# Patient Record
Sex: MALE | Race: OTHER | NOT HISPANIC OR LATINO
[De-identification: names, ages, dates, MRNs, and addresses within clinical notes are randomized per-mention and may not be internally consistent; named-entity substitution may affect disease eponyms.]

---

## 2017-10-31 PROBLEM — Z00.00 ENCOUNTER FOR PREVENTIVE HEALTH EXAMINATION: Status: ACTIVE | Noted: 2017-10-31

## 2017-11-07 ENCOUNTER — APPOINTMENT (OUTPATIENT)
Dept: SPINE | Facility: CLINIC | Age: 52
End: 2017-11-07
Payer: COMMERCIAL

## 2017-11-07 VITALS
BODY MASS INDEX: 28.23 KG/M2 | SYSTOLIC BLOOD PRESSURE: 118 MMHG | WEIGHT: 220 LBS | DIASTOLIC BLOOD PRESSURE: 70 MMHG | HEIGHT: 74 IN

## 2017-11-07 DIAGNOSIS — Z78.9 OTHER SPECIFIED HEALTH STATUS: ICD-10-CM

## 2017-11-07 DIAGNOSIS — Z82.49 FAMILY HISTORY OF ISCHEMIC HEART DISEASE AND OTHER DISEASES OF THE CIRCULATORY SYSTEM: ICD-10-CM

## 2017-11-07 DIAGNOSIS — Z86.79 PERSONAL HISTORY OF OTHER DISEASES OF THE CIRCULATORY SYSTEM: ICD-10-CM

## 2017-11-07 DIAGNOSIS — Z82.61 FAMILY HISTORY OF ARTHRITIS: ICD-10-CM

## 2017-11-07 DIAGNOSIS — Z83.3 FAMILY HISTORY OF DIABETES MELLITUS: ICD-10-CM

## 2017-11-07 PROCEDURE — 99204 OFFICE O/P NEW MOD 45 MIN: CPT

## 2017-11-07 RX ORDER — ATORVASTATIN CALCIUM 40 MG/1
40 TABLET, FILM COATED ORAL
Refills: 0 | Status: ACTIVE | COMMUNITY

## 2017-11-07 RX ORDER — LISINOPRIL 20 MG/1
20 TABLET ORAL
Refills: 0 | Status: ACTIVE | COMMUNITY

## 2017-11-07 RX ORDER — BRINZOLAMIDE 10 MG/ML
SUSPENSION/ DROPS OPHTHALMIC
Refills: 0 | Status: ACTIVE | COMMUNITY

## 2017-11-07 RX ORDER — VALACYCLOVIR 1 G/1
1 TABLET, FILM COATED ORAL
Refills: 0 | Status: ACTIVE | COMMUNITY

## 2017-12-19 ENCOUNTER — APPOINTMENT (OUTPATIENT)
Dept: SPINE | Facility: CLINIC | Age: 52
End: 2017-12-19
Payer: COMMERCIAL

## 2017-12-19 VITALS
WEIGHT: 210 LBS | HEIGHT: 74 IN | BODY MASS INDEX: 26.95 KG/M2 | DIASTOLIC BLOOD PRESSURE: 76 MMHG | SYSTOLIC BLOOD PRESSURE: 110 MMHG

## 2017-12-19 PROCEDURE — 99213 OFFICE O/P EST LOW 20 MIN: CPT

## 2018-03-29 ENCOUNTER — APPOINTMENT (OUTPATIENT)
Dept: OTOLARYNGOLOGY | Facility: HOSPITAL | Age: 53
End: 2018-03-29

## 2018-03-29 ENCOUNTER — APPOINTMENT (OUTPATIENT)
Dept: SPINE | Facility: HOSPITAL | Age: 53
End: 2018-03-29

## 2018-04-05 ENCOUNTER — OUTPATIENT (OUTPATIENT)
Dept: OUTPATIENT SERVICES | Facility: HOSPITAL | Age: 53
LOS: 1 days | End: 2018-04-05
Payer: COMMERCIAL

## 2018-04-05 VITALS
SYSTOLIC BLOOD PRESSURE: 129 MMHG | TEMPERATURE: 97 F | WEIGHT: 210.98 LBS | HEART RATE: 63 BPM | OXYGEN SATURATION: 98 % | HEIGHT: 74 IN | RESPIRATION RATE: 18 BRPM | DIASTOLIC BLOOD PRESSURE: 85 MMHG

## 2018-04-05 DIAGNOSIS — Z01.818 ENCOUNTER FOR OTHER PREPROCEDURAL EXAMINATION: ICD-10-CM

## 2018-04-05 DIAGNOSIS — D35.2 BENIGN NEOPLASM OF PITUITARY GLAND: ICD-10-CM

## 2018-04-05 DIAGNOSIS — D49.7 NEOPLASM OF UNSPECIFIED BEHAVIOR OF ENDOCRINE GLANDS AND OTHER PARTS OF NERVOUS SYSTEM: ICD-10-CM

## 2018-04-05 LAB
ANION GAP SERPL CALC-SCNC: 10 MMOL/L — SIGNIFICANT CHANGE UP (ref 5–17)
BLD GP AB SCN SERPL QL: NEGATIVE — SIGNIFICANT CHANGE UP
BUN SERPL-MCNC: 15 MG/DL — SIGNIFICANT CHANGE UP (ref 7–23)
CALCIUM SERPL-MCNC: 9.4 MG/DL — SIGNIFICANT CHANGE UP (ref 8.4–10.5)
CHLORIDE SERPL-SCNC: 103 MMOL/L — SIGNIFICANT CHANGE UP (ref 96–108)
CO2 SERPL-SCNC: 26 MMOL/L — SIGNIFICANT CHANGE UP (ref 22–31)
CREAT SERPL-MCNC: 0.96 MG/DL — SIGNIFICANT CHANGE UP (ref 0.5–1.3)
GLUCOSE SERPL-MCNC: 131 MG/DL — HIGH (ref 70–99)
HCT VFR BLD CALC: 40.6 % — SIGNIFICANT CHANGE UP (ref 39–50)
HGB BLD-MCNC: 13.5 G/DL — SIGNIFICANT CHANGE UP (ref 13–17)
MCHC RBC-ENTMCNC: 29.7 PG — SIGNIFICANT CHANGE UP (ref 27–34)
MCHC RBC-ENTMCNC: 33.3 GM/DL — SIGNIFICANT CHANGE UP (ref 32–36)
MCV RBC AUTO: 89.2 FL — SIGNIFICANT CHANGE UP (ref 80–100)
NRBC # BLD: 0 /100 WBCS — SIGNIFICANT CHANGE UP (ref 0–0)
PLATELET # BLD AUTO: 216 K/UL — SIGNIFICANT CHANGE UP (ref 150–400)
POTASSIUM SERPL-MCNC: 3.9 MMOL/L — SIGNIFICANT CHANGE UP (ref 3.5–5.3)
POTASSIUM SERPL-SCNC: 3.9 MMOL/L — SIGNIFICANT CHANGE UP (ref 3.5–5.3)
RBC # BLD: 4.55 M/UL — SIGNIFICANT CHANGE UP (ref 4.2–5.8)
RBC # FLD: 14.4 % — SIGNIFICANT CHANGE UP (ref 10.3–14.5)
RH IG SCN BLD-IMP: POSITIVE — SIGNIFICANT CHANGE UP
SODIUM SERPL-SCNC: 139 MMOL/L — SIGNIFICANT CHANGE UP (ref 135–145)
WBC # BLD: 5.46 K/UL — SIGNIFICANT CHANGE UP (ref 3.8–10.5)
WBC # FLD AUTO: 5.46 K/UL — SIGNIFICANT CHANGE UP (ref 3.8–10.5)

## 2018-04-05 PROCEDURE — 85027 COMPLETE CBC AUTOMATED: CPT

## 2018-04-05 PROCEDURE — 86901 BLOOD TYPING SEROLOGIC RH(D): CPT

## 2018-04-05 PROCEDURE — 86850 RBC ANTIBODY SCREEN: CPT

## 2018-04-05 PROCEDURE — 80048 BASIC METABOLIC PNL TOTAL CA: CPT

## 2018-04-05 PROCEDURE — G0463: CPT

## 2018-04-05 PROCEDURE — 86900 BLOOD TYPING SEROLOGIC ABO: CPT

## 2018-04-05 RX ORDER — SODIUM CHLORIDE 9 MG/ML
3 INJECTION INTRAMUSCULAR; INTRAVENOUS; SUBCUTANEOUS EVERY 8 HOURS
Qty: 0 | Refills: 0 | Status: DISCONTINUED | OUTPATIENT
Start: 2018-04-12 | End: 2018-04-12

## 2018-04-05 RX ORDER — CEFAZOLIN SODIUM 1 G
2000 VIAL (EA) INJECTION ONCE
Qty: 0 | Refills: 0 | Status: COMPLETED | OUTPATIENT
Start: 2018-04-12 | End: 2018-04-12

## 2018-04-05 NOTE — H&P PST ADULT - HISTORY OF PRESENT ILLNESS
53 y/o M PMH vision loss left eye last year, was referred for MRI of the head by opthamologist and found to 53 y/o M PMH vision loss left eye last year, was referred for MRI of the head by opthamologist and found to have a pituitary tumor.  Presents today for endoscopic removal of pituitary tumor sella meningioma.

## 2018-04-05 NOTE — H&P PST ADULT - NSANTHOSAYNRD_GEN_A_CORE
No. YESENIA screening performed.  STOP BANG Legend: 0-2 = LOW Risk; 3-4 = INTERMEDIATE Risk; 5-8 = HIGH Risk

## 2018-04-11 ENCOUNTER — TRANSCRIPTION ENCOUNTER (OUTPATIENT)
Age: 53
End: 2018-04-11

## 2018-04-11 ENCOUNTER — APPOINTMENT (OUTPATIENT)
Dept: CT IMAGING | Facility: HOSPITAL | Age: 53
End: 2018-04-11

## 2018-04-11 ENCOUNTER — APPOINTMENT (OUTPATIENT)
Dept: MRI IMAGING | Facility: HOSPITAL | Age: 53
End: 2018-04-11

## 2018-04-11 ENCOUNTER — OUTPATIENT (OUTPATIENT)
Dept: OUTPATIENT SERVICES | Facility: HOSPITAL | Age: 53
LOS: 1 days | End: 2018-04-11
Payer: COMMERCIAL

## 2018-04-11 DIAGNOSIS — D35.2 BENIGN NEOPLASM OF PITUITARY GLAND: ICD-10-CM

## 2018-04-11 DIAGNOSIS — G93.9 DISORDER OF BRAIN, UNSPECIFIED: ICD-10-CM

## 2018-04-11 PROCEDURE — 70450 CT HEAD/BRAIN W/O DYE: CPT

## 2018-04-11 PROCEDURE — 70450 CT HEAD/BRAIN W/O DYE: CPT | Mod: 26

## 2018-04-11 PROCEDURE — 70553 MRI BRAIN STEM W/O & W/DYE: CPT

## 2018-04-11 PROCEDURE — 70553 MRI BRAIN STEM W/O & W/DYE: CPT | Mod: 26

## 2018-04-11 PROCEDURE — A9585: CPT

## 2018-04-12 ENCOUNTER — APPOINTMENT (OUTPATIENT)
Dept: SPINE | Facility: HOSPITAL | Age: 53
End: 2018-04-12

## 2018-04-12 ENCOUNTER — INPATIENT (INPATIENT)
Facility: HOSPITAL | Age: 53
LOS: 7 days | Discharge: HOME | DRG: 26 | End: 2018-04-20
Attending: NEUROLOGICAL SURGERY | Admitting: NEUROLOGICAL SURGERY
Payer: COMMERCIAL

## 2018-04-12 ENCOUNTER — RESULT REVIEW (OUTPATIENT)
Age: 53
End: 2018-04-12

## 2018-04-12 ENCOUNTER — APPOINTMENT (OUTPATIENT)
Dept: OTOLARYNGOLOGY | Facility: HOSPITAL | Age: 53
End: 2018-04-12

## 2018-04-12 VITALS
RESPIRATION RATE: 16 BRPM | HEIGHT: 74 IN | WEIGHT: 212.08 LBS | HEART RATE: 65 BPM | TEMPERATURE: 98 F | DIASTOLIC BLOOD PRESSURE: 82 MMHG | SYSTOLIC BLOOD PRESSURE: 127 MMHG | OXYGEN SATURATION: 99 %

## 2018-04-12 DIAGNOSIS — D35.2 BENIGN NEOPLASM OF PITUITARY GLAND: ICD-10-CM

## 2018-04-12 DIAGNOSIS — E23.7 DISORDER OF PITUITARY GLAND, UNSPECIFIED: ICD-10-CM

## 2018-04-12 LAB
ANION GAP SERPL CALC-SCNC: 13 MMOL/L — SIGNIFICANT CHANGE UP (ref 5–17)
BASOPHILS # BLD AUTO: 0 K/UL — SIGNIFICANT CHANGE UP (ref 0–0.2)
BASOPHILS NFR BLD AUTO: 0.1 % — SIGNIFICANT CHANGE UP (ref 0–2)
BUN SERPL-MCNC: 9 MG/DL — SIGNIFICANT CHANGE UP (ref 7–23)
CALCIUM SERPL-MCNC: 8.3 MG/DL — LOW (ref 8.4–10.5)
CHLORIDE SERPL-SCNC: 103 MMOL/L — SIGNIFICANT CHANGE UP (ref 96–108)
CO2 SERPL-SCNC: 22 MMOL/L — SIGNIFICANT CHANGE UP (ref 22–31)
CREAT SERPL-MCNC: 0.86 MG/DL — SIGNIFICANT CHANGE UP (ref 0.5–1.3)
EOSINOPHIL # BLD AUTO: 0 K/UL — SIGNIFICANT CHANGE UP (ref 0–0.5)
EOSINOPHIL NFR BLD AUTO: 0.1 % — SIGNIFICANT CHANGE UP (ref 0–6)
GLUCOSE BLDC GLUCOMTR-MCNC: 144 MG/DL — HIGH (ref 70–99)
GLUCOSE SERPL-MCNC: 203 MG/DL — HIGH (ref 70–99)
HCT VFR BLD CALC: 38.6 % — LOW (ref 39–50)
HGB BLD-MCNC: 13.3 G/DL — SIGNIFICANT CHANGE UP (ref 13–17)
LYMPHOCYTES # BLD AUTO: 1 K/UL — SIGNIFICANT CHANGE UP (ref 1–3.3)
LYMPHOCYTES # BLD AUTO: 7.9 % — LOW (ref 13–44)
MAGNESIUM SERPL-MCNC: 1.8 MG/DL — SIGNIFICANT CHANGE UP (ref 1.6–2.6)
MCHC RBC-ENTMCNC: 31.4 PG — SIGNIFICANT CHANGE UP (ref 27–34)
MCHC RBC-ENTMCNC: 34.4 GM/DL — SIGNIFICANT CHANGE UP (ref 32–36)
MCV RBC AUTO: 91.4 FL — SIGNIFICANT CHANGE UP (ref 80–100)
MONOCYTES # BLD AUTO: 0.3 K/UL — SIGNIFICANT CHANGE UP (ref 0–0.9)
MONOCYTES NFR BLD AUTO: 2.5 % — SIGNIFICANT CHANGE UP (ref 2–14)
NEUTROPHILS # BLD AUTO: 11.5 K/UL — HIGH (ref 1.8–7.4)
NEUTROPHILS NFR BLD AUTO: 89.5 % — HIGH (ref 43–77)
PHOSPHATE SERPL-MCNC: 2.3 MG/DL — LOW (ref 2.5–4.5)
PLATELET # BLD AUTO: 211 K/UL — SIGNIFICANT CHANGE UP (ref 150–400)
POTASSIUM SERPL-MCNC: 4 MMOL/L — SIGNIFICANT CHANGE UP (ref 3.5–5.3)
POTASSIUM SERPL-SCNC: 4 MMOL/L — SIGNIFICANT CHANGE UP (ref 3.5–5.3)
RBC # BLD: 4.22 M/UL — SIGNIFICANT CHANGE UP (ref 4.2–5.8)
RBC # FLD: 12.1 % — SIGNIFICANT CHANGE UP (ref 10.3–14.5)
RH IG SCN BLD-IMP: POSITIVE — SIGNIFICANT CHANGE UP
SODIUM SERPL-SCNC: 138 MMOL/L — SIGNIFICANT CHANGE UP (ref 135–145)
WBC # BLD: 12.8 K/UL — HIGH (ref 3.8–10.5)
WBC # FLD AUTO: 12.8 K/UL — HIGH (ref 3.8–10.5)

## 2018-04-12 PROCEDURE — 99291 CRITICAL CARE FIRST HOUR: CPT

## 2018-04-12 PROCEDURE — 61782 SCAN PROC CRANIAL EXTRA: CPT

## 2018-04-12 PROCEDURE — 61580 CRANIOFACIAL APPROACH SKULL: CPT | Mod: 80

## 2018-04-12 PROCEDURE — 88307 TISSUE EXAM BY PATHOLOGIST: CPT | Mod: 26

## 2018-04-12 PROCEDURE — 88334 PATH CONSLTJ SURG CYTO XM EA: CPT | Mod: 26,59

## 2018-04-12 PROCEDURE — 88341 IMHCHEM/IMCYTCHM EA ADD ANTB: CPT | Mod: 26,59

## 2018-04-12 PROCEDURE — 88342 IMHCHEM/IMCYTCHM 1ST ANTB: CPT | Mod: 26,59

## 2018-04-12 PROCEDURE — 88360 TUMOR IMMUNOHISTOCHEM/MANUAL: CPT | Mod: 26

## 2018-04-12 PROCEDURE — 88305 TISSUE EXAM BY PATHOLOGIST: CPT | Mod: 26

## 2018-04-12 PROCEDURE — 61580 CRANIOFACIAL APPROACH SKULL: CPT

## 2018-04-12 PROCEDURE — 20926: CPT

## 2018-04-12 PROCEDURE — 62272 THER SPI PNXR DRG CSF: CPT

## 2018-04-12 PROCEDURE — 61608 RESECT/EXCISE CRANIAL LESION: CPT

## 2018-04-12 PROCEDURE — 15576 PEDICLE E/N/E/L/NTRORAL: CPT

## 2018-04-12 PROCEDURE — 88331 PATH CONSLTJ SURG 1 BLK 1SPC: CPT | Mod: 26

## 2018-04-12 PROCEDURE — 61608 RESECT/EXCISE CRANIAL LESION: CPT | Mod: 80

## 2018-04-12 PROCEDURE — 61781 SCAN PROC CRANIAL INTRA: CPT

## 2018-04-12 RX ORDER — INSULIN LISPRO 100/ML
VIAL (ML) SUBCUTANEOUS
Qty: 0 | Refills: 0 | Status: DISCONTINUED | OUTPATIENT
Start: 2018-04-12 | End: 2018-04-13

## 2018-04-12 RX ORDER — ONDANSETRON 8 MG/1
4 TABLET, FILM COATED ORAL EVERY 6 HOURS
Qty: 0 | Refills: 0 | Status: DISCONTINUED | OUTPATIENT
Start: 2018-04-12 | End: 2018-04-20

## 2018-04-12 RX ORDER — ATORVASTATIN CALCIUM 80 MG/1
1 TABLET, FILM COATED ORAL
Qty: 0 | Refills: 0 | COMMUNITY

## 2018-04-12 RX ORDER — OXYCODONE HYDROCHLORIDE 5 MG/1
10 TABLET ORAL EVERY 4 HOURS
Qty: 0 | Refills: 0 | Status: DISCONTINUED | OUTPATIENT
Start: 2018-04-12 | End: 2018-04-19

## 2018-04-12 RX ORDER — ATORVASTATIN CALCIUM 80 MG/1
40 TABLET, FILM COATED ORAL AT BEDTIME
Qty: 0 | Refills: 0 | Status: DISCONTINUED | OUTPATIENT
Start: 2018-04-12 | End: 2018-04-20

## 2018-04-12 RX ORDER — ACETAMINOPHEN 500 MG
650 TABLET ORAL EVERY 6 HOURS
Qty: 0 | Refills: 0 | Status: DISCONTINUED | OUTPATIENT
Start: 2018-04-12 | End: 2018-04-20

## 2018-04-12 RX ORDER — MAGNESIUM SULFATE 500 MG/ML
1 VIAL (ML) INJECTION ONCE
Qty: 0 | Refills: 0 | Status: COMPLETED | OUTPATIENT
Start: 2018-04-12 | End: 2018-04-12

## 2018-04-12 RX ORDER — LIDOCAINE HCL 20 MG/ML
0.2 VIAL (ML) INJECTION ONCE
Qty: 0 | Refills: 0 | Status: COMPLETED | OUTPATIENT
Start: 2018-04-12 | End: 2018-04-12

## 2018-04-12 RX ORDER — ACETAMINOPHEN 500 MG
1000 TABLET ORAL ONCE
Qty: 0 | Refills: 0 | Status: COMPLETED | OUTPATIENT
Start: 2018-04-12 | End: 2018-04-12

## 2018-04-12 RX ORDER — LEVOTHYROXINE SODIUM 125 MCG
88 TABLET ORAL DAILY
Qty: 0 | Refills: 0 | Status: DISCONTINUED | OUTPATIENT
Start: 2018-04-12 | End: 2018-04-20

## 2018-04-12 RX ORDER — NICARDIPINE HYDROCHLORIDE 30 MG/1
10 CAPSULE, EXTENDED RELEASE ORAL
Qty: 40 | Refills: 0 | Status: DISCONTINUED | OUTPATIENT
Start: 2018-04-12 | End: 2018-04-12

## 2018-04-12 RX ORDER — CHOLECALCIFEROL (VITAMIN D3) 125 MCG
1 CAPSULE ORAL
Qty: 0 | Refills: 0 | COMMUNITY

## 2018-04-12 RX ORDER — CALCIUM GLUCONATE 100 MG/ML
1 VIAL (ML) INTRAVENOUS ONCE
Qty: 0 | Refills: 0 | Status: COMPLETED | OUTPATIENT
Start: 2018-04-12 | End: 2018-04-13

## 2018-04-12 RX ORDER — BRINZOLAMIDE 10 MG/ML
1 SUSPENSION/ DROPS OPHTHALMIC
Qty: 0 | Refills: 0 | COMMUNITY

## 2018-04-12 RX ORDER — DOCUSATE SODIUM 100 MG
100 CAPSULE ORAL THREE TIMES A DAY
Qty: 0 | Refills: 0 | Status: DISCONTINUED | OUTPATIENT
Start: 2018-04-12 | End: 2018-04-20

## 2018-04-12 RX ORDER — PANTOPRAZOLE SODIUM 20 MG/1
40 TABLET, DELAYED RELEASE ORAL DAILY
Qty: 0 | Refills: 0 | Status: DISCONTINUED | OUTPATIENT
Start: 2018-04-12 | End: 2018-04-15

## 2018-04-12 RX ORDER — DORZOLAMIDE HYDROCHLORIDE 20 MG/ML
1 SOLUTION/ DROPS OPHTHALMIC ONCE
Qty: 0 | Refills: 0 | Status: COMPLETED | OUTPATIENT
Start: 2018-04-12 | End: 2018-04-12

## 2018-04-12 RX ORDER — SODIUM CHLORIDE 0.65 %
1 AEROSOL, SPRAY (ML) NASAL DAILY
Qty: 0 | Refills: 0 | Status: DISCONTINUED | OUTPATIENT
Start: 2018-04-12 | End: 2018-04-20

## 2018-04-12 RX ORDER — DEXTROSE MONOHYDRATE, SODIUM CHLORIDE, AND POTASSIUM CHLORIDE 50; .745; 4.5 G/1000ML; G/1000ML; G/1000ML
1000 INJECTION, SOLUTION INTRAVENOUS
Qty: 0 | Refills: 0 | Status: DISCONTINUED | OUTPATIENT
Start: 2018-04-12 | End: 2018-04-14

## 2018-04-12 RX ORDER — POTASSIUM PHOSPHATE, MONOBASIC POTASSIUM PHOSPHATE, DIBASIC 236; 224 MG/ML; MG/ML
15 INJECTION, SOLUTION INTRAVENOUS ONCE
Qty: 0 | Refills: 0 | Status: COMPLETED | OUTPATIENT
Start: 2018-04-12 | End: 2018-04-12

## 2018-04-12 RX ORDER — OXYCODONE HYDROCHLORIDE 5 MG/1
5 TABLET ORAL EVERY 4 HOURS
Qty: 0 | Refills: 0 | Status: DISCONTINUED | OUTPATIENT
Start: 2018-04-12 | End: 2018-04-12

## 2018-04-12 RX ADMIN — Medication 100 GRAM(S): at 23:44

## 2018-04-12 RX ADMIN — OXYCODONE HYDROCHLORIDE 10 MILLIGRAM(S): 5 TABLET ORAL at 18:09

## 2018-04-12 RX ADMIN — OXYCODONE HYDROCHLORIDE 5 MILLIGRAM(S): 5 TABLET ORAL at 16:17

## 2018-04-12 RX ADMIN — DORZOLAMIDE HYDROCHLORIDE 1 DROP(S): 20 SOLUTION/ DROPS OPHTHALMIC at 17:05

## 2018-04-12 RX ADMIN — POTASSIUM PHOSPHATE, MONOBASIC POTASSIUM PHOSPHATE, DIBASIC 62.5 MILLIMOLE(S): 236; 224 INJECTION, SOLUTION INTRAVENOUS at 23:44

## 2018-04-12 RX ADMIN — PANTOPRAZOLE SODIUM 40 MILLIGRAM(S): 20 TABLET, DELAYED RELEASE ORAL at 16:17

## 2018-04-12 RX ADMIN — ATORVASTATIN CALCIUM 40 MILLIGRAM(S): 80 TABLET, FILM COATED ORAL at 21:07

## 2018-04-12 RX ADMIN — Medication 1 SPRAY(S): at 17:08

## 2018-04-12 RX ADMIN — Medication 0.2 MILLILITER(S): at 05:58

## 2018-04-12 RX ADMIN — DEXTROSE MONOHYDRATE, SODIUM CHLORIDE, AND POTASSIUM CHLORIDE 75 MILLILITER(S): 50; .745; 4.5 INJECTION, SOLUTION INTRAVENOUS at 21:07

## 2018-04-12 RX ADMIN — Medication 88 MICROGRAM(S): at 17:05

## 2018-04-12 RX ADMIN — Medication 1000 MILLIGRAM(S): at 15:20

## 2018-04-12 RX ADMIN — OXYCODONE HYDROCHLORIDE 10 MILLIGRAM(S): 5 TABLET ORAL at 17:39

## 2018-04-12 RX ADMIN — Medication 100 MILLIGRAM(S): at 21:07

## 2018-04-12 RX ADMIN — Medication 400 MILLIGRAM(S): at 14:50

## 2018-04-12 RX ADMIN — OXYCODONE HYDROCHLORIDE 5 MILLIGRAM(S): 5 TABLET ORAL at 16:47

## 2018-04-12 RX ADMIN — SODIUM CHLORIDE 3 MILLILITER(S): 9 INJECTION INTRAMUSCULAR; INTRAVENOUS; SUBCUTANEOUS at 05:58

## 2018-04-12 NOTE — PROGRESS NOTE ADULT - SUBJECTIVE AND OBJECTIVE BOX
Patient seen and examined    T(C): 36.6 (04-12-18 @ 15:00), Max: 37.2 (04-12-18 @ 13:50)  HR: 85 (04-12-18 @ 18:45) (65 - 85)  BP: 131/73 (04-12-18 @ 18:45) (127/82 - 131/73)  RR: 18 (04-12-18 @ 18:45) (4 - 21)  SpO2: 100% (04-12-18 @ 18:45) (99% - 100%)  04-12-18 @ 07:01  -  04-12-18 @ 20:03  --------------------------------------------------------  IN: 826 mL / OUT: 1105 mL / NET: -279 mL    acetaminophen   Tablet 650 milliGRAM(s) Oral every 6 hours PRN  acetaminophen   Tablet. 650 milliGRAM(s) Oral every 6 hours PRN  atorvastatin 40 milliGRAM(s) Oral at bedtime  ceFAZolin   IVPB 2000 milliGRAM(s) IV Intermittent once  docusate sodium 100 milliGRAM(s) Oral three times a day  levothyroxine 88 MICROGram(s) Oral daily  niCARdipine Infusion 10 mG/Hr IV Continuous <Continuous>  ondansetron Injectable 4 milliGRAM(s) IV Push every 6 hours PRN  oxyCODONE    IR 5 milliGRAM(s) Oral every 4 hours PRN  oxyCODONE    IR 10 milliGRAM(s) Oral every 4 hours PRN  pantoprazole    Tablet 40 milliGRAM(s) Oral daily  sodium chloride 0.65% Nasal 1 Spray(s) Both Nostrils daily  sodium chloride 0.9% with potassium chloride 20 mEq/L 1000 milliLiter(s) IV Continuous <Continuous>        Exam stable    Continue same management     [facility]

## 2018-04-12 NOTE — PROGRESS NOTE ADULT - SUBJECTIVE AND OBJECTIVE BOX
HPI:  51 y/o M with PMH of hypothyroidism, presented with vision loss in left eye last year, was referred for MRI of the head by ophthalmologist and found to have a pituitary tumor.  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    VITALS:  T(C): , Max: 37.2 (04-12-18 @ 13:50)  HR:  (65 - 81)  BP:  (127/82 - 127/82)  ABP:  (144/66 - 163/75)  RR:  (15 - 17)  SpO2:  (99% - 100%)    LABS: reviewed.    IMAGING:   Recent imaging studies were reviewed.     MEDICATIONS:  acetaminophen   Tablet. 650 milliGRAM(s) Oral every 6 hours PRN  atorvastatin 40 milliGRAM(s) Oral at bedtime  docusate sodium 100 milliGRAM(s) Oral three times a day  dorzolamide 2% Ophthalmic Solution 1 Drop(s) Both EYES once  levothyroxine 88 MICROGram(s) Oral daily  ondansetron Injectable 4 milliGRAM(s) IV Push every 6 hours PRN  pantoprazole    Tablet 40 milliGRAM(s) Oral daily  sodium chloride 0.65% Nasal 1 Spray(s) Both Nostrils daily  sodium chloride 0.9% with potassium chloride 20 mEq/L 1000 milliLiter(s) IV Continuous <Continuous>    EXAMINATION:  General:  calm, cooperative.  HEENT:  EOMI, PERRLA, L temporal hemianopsia. Face symmetric.  Neuro:  awake, alert, oriented x 3, follows commands, moves all extremities, 5/5 throughout. Sensation intact including facial.   Cards:  RRR  Respiratory:  no respiratory distress  Abdomen:  soft  Extremities:  no edema  Skin:  warm/dry

## 2018-04-12 NOTE — PROGRESS NOTE ADULT - ASSESSMENT
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    NEURO:  neurochecks q1hr  CT Head in AM, MRI post-op, Surgical drains per NSGY,   Pain control  Activity: [x] OOB as tolerated [] Bedrest [] PT [] OT [] PMNR    PULM:  Pituitary precautions (no incentive spirometry, no straws, no BIPAP)  Tolerates RA.    CV:  -150mmHg, d/c a-line in AM if hemodynamically stable    RENAL:  Strict I+Os  IVF until good PO intake  Drink to thirst    GI:  Diet: Dysphagia screen and then advance diet as tolerated  GI prophylaxis [x] not indicated [] PPI [] other: Pantoprazole home med  Bowel regimen [x] colace [x] senna [] other:    ENDO:   Goal euglycemia (-180)  Pituitary labs  Monitor for DI  Continue Levothyroxine.    HEME/ONC:  VTE prophylaxis: [x] SCDs [] chemoprophylaxis [x] hold chemoprophylaxis due to: fresh postop [] high risk of DVT/PE on admission due to:    ID:  Flor-op antibiotics    SOCIAL/FAMILY:  [x] awaiting [] updated at bedside [] family meeting    CODE STATUS:  [x] Full Code [] DNR [] DNI [] Palliative/Comfort Care    DISPOSITION:  [x] ICU [] Stroke Unit [] Floor [] EMU [] RCU [] PCU    [x] Patient is at high risk of neurologic deterioration/death due to: postop bleeding, cerebral swelling.    Time seen: 1430.  Time spent: 45 critical care minutes

## 2018-04-12 NOTE — PROGRESS NOTE ADULT - SUBJECTIVE AND OBJECTIVE BOX
POD/STATUS POST/ENT ISSUE: POD #0 for TSRP and resection of meningioma     INTERVAL HPI: 52y POD #0 TSRP and resection of meningioma c/b CSF leak, repaired with fat and nasoseptal flap. Pt denies any clear drainage or bleeding from nares, no salty taste, no PND. Pt with headache controlled with meds     Vital Signs Last 24 Hrs  T(C): 36.6 (12 Apr 2018 15:00), Max: 37.2 (12 Apr 2018 13:50)  T(F): 97.8 (12 Apr 2018 15:00), Max: 99 (12 Apr 2018 13:50)  HR: 85 (12 Apr 2018 18:45) (65 - 85)  BP: 131/73 (12 Apr 2018 18:45) (127/82 - 131/73)  BP(mean): 89 (12 Apr 2018 18:45) (89 - 89)  RR: 18 (12 Apr 2018 18:45) (4 - 21)  SpO2: 100% (12 Apr 2018 18:45) (99% - 100%)    LABS:      PHYSICAL EXAM:  Gen: NAD, well-developed  Head: Normocephalic, Atraumatic  Face: no edema/erythema/fluctuance  Eyes:  no scleral injection  Nose: no discharge  Mouth: No Stridor / Drooling / Trismus.  Mucosa moist, tongue/uvula midline, oropharynx clear  Neck: Flat, supple, no lymphadenopathy, trachea midline, no masses  Resp: breathing easily, no stridor  CV: no peripheral edema/cyanosis

## 2018-04-12 NOTE — PATIENT PROFILE ADULT. - VISION (WITH CORRECTIVE LENSES IF THE PATIENT USUALLY WEARS THEM):
Partially impaired: cannot see medication labels or newsprint, but can see obstacles in path, and the surrounding layout; can count fingers at arm's length/loss of vision left eye

## 2018-04-12 NOTE — BRIEF OPERATIVE NOTE - PROCEDURE
<<-----Click on this checkbox to enter Procedure Transphenoidal or transnasal resection of pituitary tumor with magnetic resonance imaging guidance  04/12/2018    Active  [id number]

## 2018-04-12 NOTE — PROGRESS NOTE ADULT - ASSESSMENT
52y POD #0 TSRP and resection of meningioma. No complaints of nasal drainage or epistaxis. Pt does have headache controlled with pain meds

## 2018-04-12 NOTE — BRIEF OPERATIVE NOTE - PRE-OP DX
Meningioma  04/12/2018    Active  Jarrod Zapata  Pituitary tumor  04/12/2018    Active  Jarrod Zapata

## 2018-04-12 NOTE — PROGRESS NOTE ADULT - PROBLEM SELECTOR PLAN 1
- continue to monitor for CSF leak   - pain control   - cnt care per NSCU  - NS qid, leave ocean spray at bedside.   - Call ENT prn

## 2018-04-13 LAB
ALBUMIN SERPL ELPH-MCNC: 3.9 G/DL — SIGNIFICANT CHANGE UP (ref 3.3–5)
ALP SERPL-CCNC: 53 U/L — SIGNIFICANT CHANGE UP (ref 40–120)
ALT FLD-CCNC: 15 U/L RC — SIGNIFICANT CHANGE UP (ref 10–45)
ANION GAP SERPL CALC-SCNC: 11 MMOL/L — SIGNIFICANT CHANGE UP (ref 5–17)
ANION GAP SERPL CALC-SCNC: 11 MMOL/L — SIGNIFICANT CHANGE UP (ref 5–17)
ANION GAP SERPL CALC-SCNC: 12 MMOL/L — SIGNIFICANT CHANGE UP (ref 5–17)
ANION GAP SERPL CALC-SCNC: 12 MMOL/L — SIGNIFICANT CHANGE UP (ref 5–17)
AST SERPL-CCNC: 15 U/L — SIGNIFICANT CHANGE UP (ref 10–40)
BASOPHILS # BLD AUTO: 0 K/UL — SIGNIFICANT CHANGE UP (ref 0–0.2)
BASOPHILS NFR BLD AUTO: 0.1 % — SIGNIFICANT CHANGE UP (ref 0–2)
BILIRUB SERPL-MCNC: 0.7 MG/DL — SIGNIFICANT CHANGE UP (ref 0.2–1.2)
BUN SERPL-MCNC: 10 MG/DL — SIGNIFICANT CHANGE UP (ref 7–23)
BUN SERPL-MCNC: 10 MG/DL — SIGNIFICANT CHANGE UP (ref 7–23)
BUN SERPL-MCNC: 11 MG/DL — SIGNIFICANT CHANGE UP (ref 7–23)
BUN SERPL-MCNC: 11 MG/DL — SIGNIFICANT CHANGE UP (ref 7–23)
CALCIUM SERPL-MCNC: 8.6 MG/DL — SIGNIFICANT CHANGE UP (ref 8.4–10.5)
CALCIUM SERPL-MCNC: 8.6 MG/DL — SIGNIFICANT CHANGE UP (ref 8.4–10.5)
CALCIUM SERPL-MCNC: 8.7 MG/DL — SIGNIFICANT CHANGE UP (ref 8.4–10.5)
CALCIUM SERPL-MCNC: 8.9 MG/DL — SIGNIFICANT CHANGE UP (ref 8.4–10.5)
CHLORIDE SERPL-SCNC: 102 MMOL/L — SIGNIFICANT CHANGE UP (ref 96–108)
CHLORIDE SERPL-SCNC: 104 MMOL/L — SIGNIFICANT CHANGE UP (ref 96–108)
CHLORIDE SERPL-SCNC: 105 MMOL/L — SIGNIFICANT CHANGE UP (ref 96–108)
CHLORIDE SERPL-SCNC: 107 MMOL/L — SIGNIFICANT CHANGE UP (ref 96–108)
CO2 SERPL-SCNC: 20 MMOL/L — LOW (ref 22–31)
CO2 SERPL-SCNC: 21 MMOL/L — LOW (ref 22–31)
CO2 SERPL-SCNC: 22 MMOL/L — SIGNIFICANT CHANGE UP (ref 22–31)
CO2 SERPL-SCNC: 22 MMOL/L — SIGNIFICANT CHANGE UP (ref 22–31)
CORTIS AM PEAK SERPL-MCNC: 20 UG/DL — HIGH (ref 6–18.4)
CREAT SERPL-MCNC: 0.79 MG/DL — SIGNIFICANT CHANGE UP (ref 0.5–1.3)
CREAT SERPL-MCNC: 0.79 MG/DL — SIGNIFICANT CHANGE UP (ref 0.5–1.3)
CREAT SERPL-MCNC: 0.83 MG/DL — SIGNIFICANT CHANGE UP (ref 0.5–1.3)
CREAT SERPL-MCNC: 0.84 MG/DL — SIGNIFICANT CHANGE UP (ref 0.5–1.3)
EOSINOPHIL # BLD AUTO: 0 K/UL — SIGNIFICANT CHANGE UP (ref 0–0.5)
EOSINOPHIL NFR BLD AUTO: 0.2 % — SIGNIFICANT CHANGE UP (ref 0–6)
GLUCOSE BLDC GLUCOMTR-MCNC: 108 MG/DL — HIGH (ref 70–99)
GLUCOSE BLDC GLUCOMTR-MCNC: 119 MG/DL — HIGH (ref 70–99)
GLUCOSE BLDC GLUCOMTR-MCNC: 157 MG/DL — HIGH (ref 70–99)
GLUCOSE SERPL-MCNC: 153 MG/DL — HIGH (ref 70–99)
GLUCOSE SERPL-MCNC: 155 MG/DL — HIGH (ref 70–99)
GLUCOSE SERPL-MCNC: 167 MG/DL — HIGH (ref 70–99)
GLUCOSE SERPL-MCNC: 183 MG/DL — HIGH (ref 70–99)
HBA1C BLD-MCNC: 6.4 % — HIGH (ref 4–5.6)
HCT VFR BLD CALC: 40.3 % — SIGNIFICANT CHANGE UP (ref 39–50)
HGB BLD-MCNC: 13.1 G/DL — SIGNIFICANT CHANGE UP (ref 13–17)
LYMPHOCYTES # BLD AUTO: 13.5 % — SIGNIFICANT CHANGE UP (ref 13–44)
LYMPHOCYTES # BLD AUTO: 2.6 K/UL — SIGNIFICANT CHANGE UP (ref 1–3.3)
MAGNESIUM SERPL-MCNC: 2.2 MG/DL — SIGNIFICANT CHANGE UP (ref 1.6–2.6)
MCHC RBC-ENTMCNC: 29.9 PG — SIGNIFICANT CHANGE UP (ref 27–34)
MCHC RBC-ENTMCNC: 32.4 GM/DL — SIGNIFICANT CHANGE UP (ref 32–36)
MCV RBC AUTO: 92.1 FL — SIGNIFICANT CHANGE UP (ref 80–100)
MONOCYTES # BLD AUTO: 1.3 K/UL — HIGH (ref 0–0.9)
MONOCYTES NFR BLD AUTO: 6.7 % — SIGNIFICANT CHANGE UP (ref 2–14)
NEUTROPHILS # BLD AUTO: 15.4 K/UL — HIGH (ref 1.8–7.4)
NEUTROPHILS NFR BLD AUTO: 79.5 % — HIGH (ref 43–77)
OSMOLALITY SERPL: 291 MOS/KG — SIGNIFICANT CHANGE UP (ref 275–300)
PHOSPHATE SERPL-MCNC: 1.6 MG/DL — LOW (ref 2.5–4.5)
PLATELET # BLD AUTO: 224 K/UL — SIGNIFICANT CHANGE UP (ref 150–400)
POTASSIUM SERPL-MCNC: 4 MMOL/L — SIGNIFICANT CHANGE UP (ref 3.5–5.3)
POTASSIUM SERPL-MCNC: 4.2 MMOL/L — SIGNIFICANT CHANGE UP (ref 3.5–5.3)
POTASSIUM SERPL-MCNC: 4.2 MMOL/L — SIGNIFICANT CHANGE UP (ref 3.5–5.3)
POTASSIUM SERPL-MCNC: 4.3 MMOL/L — SIGNIFICANT CHANGE UP (ref 3.5–5.3)
POTASSIUM SERPL-SCNC: 4 MMOL/L — SIGNIFICANT CHANGE UP (ref 3.5–5.3)
POTASSIUM SERPL-SCNC: 4.2 MMOL/L — SIGNIFICANT CHANGE UP (ref 3.5–5.3)
POTASSIUM SERPL-SCNC: 4.2 MMOL/L — SIGNIFICANT CHANGE UP (ref 3.5–5.3)
POTASSIUM SERPL-SCNC: 4.3 MMOL/L — SIGNIFICANT CHANGE UP (ref 3.5–5.3)
PROT SERPL-MCNC: 6.9 G/DL — SIGNIFICANT CHANGE UP (ref 6–8.3)
RBC # BLD: 4.38 M/UL — SIGNIFICANT CHANGE UP (ref 4.2–5.8)
RBC # FLD: 12.6 % — SIGNIFICANT CHANGE UP (ref 10.3–14.5)
SODIUM SERPL-SCNC: 135 MMOL/L — SIGNIFICANT CHANGE UP (ref 135–145)
SODIUM SERPL-SCNC: 136 MMOL/L — SIGNIFICANT CHANGE UP (ref 135–145)
SODIUM SERPL-SCNC: 138 MMOL/L — SIGNIFICANT CHANGE UP (ref 135–145)
SODIUM SERPL-SCNC: 140 MMOL/L — SIGNIFICANT CHANGE UP (ref 135–145)
SP GR UR STRIP: 1.01 — SIGNIFICANT CHANGE UP (ref 1.01–1.02)
T4 FREE SERPL-MCNC: 0.6 NG/DL — LOW (ref 0.9–1.8)
TSH SERPL-MCNC: 1.21 UIU/ML — SIGNIFICANT CHANGE UP (ref 0.27–4.2)
WBC # BLD: 19.4 K/UL — HIGH (ref 3.8–10.5)
WBC # FLD AUTO: 19.4 K/UL — HIGH (ref 3.8–10.5)

## 2018-04-13 PROCEDURE — 70450 CT HEAD/BRAIN W/O DYE: CPT | Mod: 26

## 2018-04-13 PROCEDURE — 99233 SBSQ HOSP IP/OBS HIGH 50: CPT

## 2018-04-13 PROCEDURE — 99024 POSTOP FOLLOW-UP VISIT: CPT

## 2018-04-13 RX ORDER — ACETAMINOPHEN 500 MG
1000 TABLET ORAL ONCE
Qty: 0 | Refills: 0 | Status: COMPLETED | OUTPATIENT
Start: 2018-04-13 | End: 2018-04-13

## 2018-04-13 RX ORDER — FENTANYL CITRATE 50 UG/ML
25 INJECTION INTRAVENOUS ONCE
Qty: 0 | Refills: 0 | Status: DISCONTINUED | OUTPATIENT
Start: 2018-04-13 | End: 2018-04-13

## 2018-04-13 RX ORDER — HYDRALAZINE HCL 50 MG
5 TABLET ORAL ONCE
Qty: 0 | Refills: 0 | Status: COMPLETED | OUTPATIENT
Start: 2018-04-13 | End: 2018-04-13

## 2018-04-13 RX ORDER — HYDRALAZINE HCL 50 MG
10 TABLET ORAL ONCE
Qty: 0 | Refills: 0 | Status: COMPLETED | OUTPATIENT
Start: 2018-04-13 | End: 2018-04-13

## 2018-04-13 RX ORDER — AMLODIPINE BESYLATE 2.5 MG/1
5 TABLET ORAL DAILY
Qty: 0 | Refills: 0 | Status: DISCONTINUED | OUTPATIENT
Start: 2018-04-13 | End: 2018-04-16

## 2018-04-13 RX ADMIN — FENTANYL CITRATE 25 MICROGRAM(S): 50 INJECTION INTRAVENOUS at 07:45

## 2018-04-13 RX ADMIN — FENTANYL CITRATE 25 MICROGRAM(S): 50 INJECTION INTRAVENOUS at 07:30

## 2018-04-13 RX ADMIN — Medication 400 MILLIGRAM(S): at 23:00

## 2018-04-13 RX ADMIN — Medication 400 MILLIGRAM(S): at 08:25

## 2018-04-13 RX ADMIN — Medication 200 GRAM(S): at 05:13

## 2018-04-13 RX ADMIN — Medication 1000 MILLIGRAM(S): at 23:30

## 2018-04-13 RX ADMIN — PANTOPRAZOLE SODIUM 40 MILLIGRAM(S): 20 TABLET, DELAYED RELEASE ORAL at 13:07

## 2018-04-13 RX ADMIN — Medication 1000 MILLIGRAM(S): at 15:30

## 2018-04-13 RX ADMIN — OXYCODONE HYDROCHLORIDE 10 MILLIGRAM(S): 5 TABLET ORAL at 11:22

## 2018-04-13 RX ADMIN — Medication 88 MICROGRAM(S): at 05:13

## 2018-04-13 RX ADMIN — AMLODIPINE BESYLATE 5 MILLIGRAM(S): 2.5 TABLET ORAL at 08:21

## 2018-04-13 RX ADMIN — OXYCODONE HYDROCHLORIDE 10 MILLIGRAM(S): 5 TABLET ORAL at 05:00

## 2018-04-13 RX ADMIN — OXYCODONE HYDROCHLORIDE 10 MILLIGRAM(S): 5 TABLET ORAL at 05:30

## 2018-04-13 RX ADMIN — Medication 100 MILLIGRAM(S): at 22:18

## 2018-04-13 RX ADMIN — Medication 1000 MILLIGRAM(S): at 08:40

## 2018-04-13 RX ADMIN — OXYCODONE HYDROCHLORIDE 10 MILLIGRAM(S): 5 TABLET ORAL at 19:00

## 2018-04-13 RX ADMIN — Medication 5 MILLIGRAM(S): at 21:00

## 2018-04-13 RX ADMIN — OXYCODONE HYDROCHLORIDE 10 MILLIGRAM(S): 5 TABLET ORAL at 19:30

## 2018-04-13 RX ADMIN — Medication 10 MILLIGRAM(S): at 15:19

## 2018-04-13 RX ADMIN — OXYCODONE HYDROCHLORIDE 10 MILLIGRAM(S): 5 TABLET ORAL at 11:40

## 2018-04-13 RX ADMIN — Medication 1 SPRAY(S): at 13:07

## 2018-04-13 RX ADMIN — ATORVASTATIN CALCIUM 40 MILLIGRAM(S): 80 TABLET, FILM COATED ORAL at 22:18

## 2018-04-13 RX ADMIN — Medication 400 MILLIGRAM(S): at 15:19

## 2018-04-13 RX ADMIN — Medication 100 MILLIGRAM(S): at 05:13

## 2018-04-13 RX ADMIN — Medication 100 MILLIGRAM(S): at 17:18

## 2018-04-13 RX ADMIN — Medication 1: at 17:18

## 2018-04-13 NOTE — PROGRESS NOTE ADULT - SUBJECTIVE AND OBJECTIVE BOX
HPI:  53 y/o M with PMH of hypothyroidism, presented with vision loss in left eye last year, was referred for MRI of the head by ophthalmologist and found to have a pituitary tumor.  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    VITALS: reviewed  LABS: reviewed  IMAGING:   Recent imaging studies were reviewed  MEDICATIONS: reviewed    EXAMINATION:  General:  calm, cooperative.  HEENT:  EOMI, PERRLA, L temporal hemianopsia. Face symmetric.  Neuro:  awake, alert, oriented x 3, follows commands, moves all extremities, 5/5 throughout. Sensation intact including facial.   Cards:  RRR  Respiratory:  no respiratory distress  Abdomen:  soft  Extremities:  no edema  Skin:  warm/dry

## 2018-04-13 NOTE — PROGRESS NOTE ADULT - PROBLEM SELECTOR PLAN 1
continue to monitor for CSF leak   - pain control   - cnt care per NSCU  - NS qid, leave ocean spray at bedside.

## 2018-04-13 NOTE — OCCUPATIONAL THERAPY INITIAL EVALUATION ADULT - ADDITIONAL COMMENTS
CT Head 4/11: Multifocal lesions in the region of the planum sphenoidale and sella turcica involving the sellar and suprasellar regions concerning for pituitary tumor with cavernous sinuses invasion, described above Pt s/p Transphenoidal or transnasal resection of pituitary tumor with magnetic resonance imaging guidance 4/12/18.  CT Head 4/11: Multifocal lesions in the region of the planum sphenoidale and sella turcica involving the sellar and suprasellar regions concerning for pituitary tumor with cavernous sinuses invasion, described above.

## 2018-04-13 NOTE — OCCUPATIONAL THERAPY INITIAL EVALUATION ADULT - DIAGNOSIS, OT EVAL
Pt with impaired strength, balance, vision impacting pt's ability to complete ADLs, IADLs, work, drive.

## 2018-04-13 NOTE — PROGRESS NOTE ADULT - ASSESSMENT
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    NEURO:  neurochecks q1hr  CT Head in AM, MRI post-op  LD at 5cc/hr  Pain control  Activity: [x] OOB as tolerated [] Bedrest [x] PT [x] OT [] PMNR    PULM:  Pituitary precautions (no incentive spirometry, no straws, no BIPAP)  Tolerates RA.    CV:  -150mmHg, d/c a-line in AM if hemodynamically stable    RENAL:  Strict I+Os  IVF until good PO intake  Drink to thirst    GI:  Diet: Dysphagia screen and then advance diet as tolerated  GI prophylaxis [x] not indicated [] PPI [] other: Pantoprazole home med  Bowel regimen [x] colace [x] senna [] other:    ENDO:   Goal euglycemia (-180)  Pituitary labs  Monitor for DI  Continue Levothyroxine.    HEME/ONC:  VTE prophylaxis: [x] SCDs [x] chemoprophylaxis start lovenox pending stable am CTH [] hold chemoprophylaxis due to: fresh postop [] high risk of DVT/PE on admission due to:    ID:  Flor-op antibiotics    SOCIAL/FAMILY:  [x] awaiting [] updated at bedside [] family meeting    CODE STATUS:  [x] Full Code [] DNR [] DNI [] Palliative/Comfort Care    DISPOSITION:  [x] ICU [] Stroke Unit [] Floor [] EMU [] RCU [] PCU    [x] Patient is at high risk of neurologic deterioration/death due to: postop bleeding, cerebral swelling, csf leak, cns infection    Time spent: 45 critical care minutes ASSESSMENT:   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    NEURO:  neurochecks q1hr  CT Head in AM, MRI post-op  LD at 5cc/hr  Pain control  Activity: [x] OOB as tolerated [] Bedrest [x] PT [x] OT [] PMNR    PULM:  Pituitary precautions (no incentive spirometry, no straws, no BIPAP)  Tolerates RA.    CV:  -150mmHg, d/c a-line in AM if hemodynamically stable    RENAL:  Strict I+Os  IVF until good PO intake  Drink to thirst    GI:  Diet: Dysphagia screen and then advance diet as tolerated  GI prophylaxis [x] not indicated [] PPI [] other: Pantoprazole home med  Bowel regimen [x] colace [x] senna [] other:    ENDO:   Goal euglycemia (-180)  Pituitary labs  Monitor for DI  Continue Levothyroxine.    HEME/ONC:  VTE prophylaxis: [x] SCDs [x] chemoprophylaxis start lovenox pending stable am CTH [] hold chemoprophylaxis due to: fresh postop [] high risk of DVT/PE on admission due to:    ID:  Flor-op antibiotics    SOCIAL/FAMILY:  [x] awaiting [] updated at bedside [] family meeting    CODE STATUS:  [x] Full Code [] DNR [] DNI [] Palliative/Comfort Care    DISPOSITION:  [x] ICU [] Stroke Unit [] Floor [] EMU [] RCU [] PCU    [] Patient is at high risk of neurologic deterioration/death due to:     Time spent:

## 2018-04-13 NOTE — OCCUPATIONAL THERAPY INITIAL EVALUATION ADULT - GENERAL OBSERVATIONS, REHAB EVAL
Pt found semi-supine in bed, +lumbar drain (clamped), +A line, +IV, ICU monitoring, nasal dressing C/D/I.

## 2018-04-13 NOTE — PROGRESS NOTE ADULT - SUBJECTIVE AND OBJECTIVE BOX
Patient seen and examined at bedside.    T(C): 36.9 (04-13-18 @ 03:00), Max: 37.2 (04-12-18 @ 13:50)  HR: 83 (04-13-18 @ 03:00) (65 - 85)  BP: 144/72 (04-13-18 @ 03:00) (127/82 - 147/82)  RR: 18 (04-13-18 @ 03:00) (4 - 21)  SpO2: 99% (04-13-18 @ 03:00) (99% - 100%)  Wt(kg): --    EXAM:    AOx3, Following Commands  CN: PERRL, EOMI, no facial droop  Motor: 5/5 throughout, no drift  Sensation intact to light touch  Reflexes: no clonus   No leak seen    04-12    138  |  103  |  9   ----------------------------<  203<H>  4.0   |  22  |  0.86    Ca    8.3<L>      12 Apr 2018 20:47  Phos  2.3     04-12  Mg     1.8     04-12      I&O's Summary    12 Apr 2018 07:01  -  13 Apr 2018 03:38  --------------------------------------------------------  IN: 1926 mL / OUT: 2015 mL / NET: -89 mL

## 2018-04-13 NOTE — OCCUPATIONAL THERAPY INITIAL EVALUATION ADULT - PERTINENT HX OF CURRENT PROBLEM, REHAB EVAL
53 y/o M with PMH of hypothyroidism, presented with vision loss in left eye last year, was referred for MRI of the head by ophthalmologist and found to have a pituitary tumor.

## 2018-04-13 NOTE — PROGRESS NOTE ADULT - ASSESSMENT
52y POD #1 TSRP and resection of meningioma and pituitary adenoma with CSF leak repaired with nasal septal flap. No complaints of nasal drainage or epistaxis. Pt does have headache controlled with pain meds

## 2018-04-13 NOTE — OCCUPATIONAL THERAPY INITIAL EVALUATION ADULT - BALANCE TRAINING, PT EVAL
Goal: Pt will demonstrate G dynamic standing balance to increase safety/independence for ADL/IADL compeltion.

## 2018-04-13 NOTE — PROGRESS NOTE ADULT - ASSESSMENT
52M with pituitary mass s/p TSP for resection 4/12/18    -Neurochecks q1h  -LD at 5cc/hr  -CT in AM  -DI watch  -MRI before discharge

## 2018-04-13 NOTE — PROGRESS NOTE ADULT - SUBJECTIVE AND OBJECTIVE BOX
POD/STATUS POST/ENT ISSUE:  POD #1 for TSRP and resection of meningioma    INTERVAL HPI: 52y POD #1 TSRP and resection of meningioma c/b CSF leak, repaired with fat and nasoseptal flap. Pt denies any clear drainage or bleeding from nares, no salty taste, no PND. Pt with headache controlled with meds. No fevers overnight    Vital Signs Last 24 Hrs  T(C): 37.3 (13 Apr 2018 07:00), Max: 37.3 (13 Apr 2018 07:00)  T(F): 99.2 (13 Apr 2018 07:00), Max: 99.2 (13 Apr 2018 07:00)  HR: 68 (13 Apr 2018 08:00) (68 - 85)  BP: 147/82 (13 Apr 2018 08:00) (131/73 - 156/93)  BP(mean): 100 (13 Apr 2018 08:00) (89 - 108)  RR: 16 (13 Apr 2018 08:00) (4 - 23)  SpO2: 100% (13 Apr 2018 08:00) (99% - 100%)    PHYSICAL EXAM:  Gen: NAD, well-developed  Head: Normocephalic, Atraumatic  Eyes:  no scleral injection  Nose: Small amount of bloody discharge from B/L nares, no active bleeding, no CSF leak. Mustache drsg in place.   Mouth: Mucosa moist, tongue/uvula midline, oropharynx clear  Neck: Flat, supple, no lymphadenopathy, trachea midline, no masses  Resp:  no stridor  CV: no peripheral edema/cyanosis    LABS:                        13.3   12.8  )-----------( 211      ( 12 Apr 2018 20:47 )             38.6

## 2018-04-13 NOTE — OCCUPATIONAL THERAPY INITIAL EVALUATION ADULT - LIVES WITH, PROFILE
alone/Pt lives alone in an apartment with elevator access and a tub. Pt reports upon discharge he will be staying at his mother's private home with 4 steps to enter, 12 steps to bedroom on second floor. Prior to admission pt was independent with ADLs, IADLs, functional mobility, driving, working.

## 2018-04-13 NOTE — PROGRESS NOTE ADULT - SUBJECTIVE AND OBJECTIVE BOX
Pt seen and examined.  LD at 5cc/hr  Awake, alert.  Says vision is better.  can see better in periphery.  No CSF leak  SANCHEZ well    CT - post-op changes.    Doing well POD #1  Cont' LD at 5cc/hr  Cont' post-op care

## 2018-04-14 LAB
ANION GAP SERPL CALC-SCNC: 11 MMOL/L — SIGNIFICANT CHANGE UP (ref 5–17)
ANION GAP SERPL CALC-SCNC: 12 MMOL/L — SIGNIFICANT CHANGE UP (ref 5–17)
BUN SERPL-MCNC: 11 MG/DL — SIGNIFICANT CHANGE UP (ref 7–23)
BUN SERPL-MCNC: 12 MG/DL — SIGNIFICANT CHANGE UP (ref 7–23)
CALCIUM SERPL-MCNC: 8.8 MG/DL — SIGNIFICANT CHANGE UP (ref 8.4–10.5)
CALCIUM SERPL-MCNC: 9 MG/DL — SIGNIFICANT CHANGE UP (ref 8.4–10.5)
CHLORIDE SERPL-SCNC: 103 MMOL/L — SIGNIFICANT CHANGE UP (ref 96–108)
CHLORIDE SERPL-SCNC: 105 MMOL/L — SIGNIFICANT CHANGE UP (ref 96–108)
CO2 SERPL-SCNC: 21 MMOL/L — LOW (ref 22–31)
CO2 SERPL-SCNC: 22 MMOL/L — SIGNIFICANT CHANGE UP (ref 22–31)
CORTIS AM PEAK SERPL-MCNC: 20.4 UG/DL — HIGH (ref 6–18.4)
CREAT SERPL-MCNC: 0.88 MG/DL — SIGNIFICANT CHANGE UP (ref 0.5–1.3)
CREAT SERPL-MCNC: 0.88 MG/DL — SIGNIFICANT CHANGE UP (ref 0.5–1.3)
GLUCOSE SERPL-MCNC: 145 MG/DL — HIGH (ref 70–99)
GLUCOSE SERPL-MCNC: 150 MG/DL — HIGH (ref 70–99)
POTASSIUM SERPL-MCNC: 4 MMOL/L — SIGNIFICANT CHANGE UP (ref 3.5–5.3)
POTASSIUM SERPL-MCNC: 4.3 MMOL/L — SIGNIFICANT CHANGE UP (ref 3.5–5.3)
POTASSIUM SERPL-SCNC: 4 MMOL/L — SIGNIFICANT CHANGE UP (ref 3.5–5.3)
POTASSIUM SERPL-SCNC: 4.3 MMOL/L — SIGNIFICANT CHANGE UP (ref 3.5–5.3)
SODIUM SERPL-SCNC: 137 MMOL/L — SIGNIFICANT CHANGE UP (ref 135–145)
SODIUM SERPL-SCNC: 137 MMOL/L — SIGNIFICANT CHANGE UP (ref 135–145)

## 2018-04-14 PROCEDURE — 99233 SBSQ HOSP IP/OBS HIGH 50: CPT

## 2018-04-14 RX ORDER — ENOXAPARIN SODIUM 100 MG/ML
40 INJECTION SUBCUTANEOUS
Qty: 0 | Refills: 0 | Status: DISCONTINUED | OUTPATIENT
Start: 2018-04-14 | End: 2018-04-20

## 2018-04-14 RX ADMIN — ENOXAPARIN SODIUM 40 MILLIGRAM(S): 100 INJECTION SUBCUTANEOUS at 17:56

## 2018-04-14 RX ADMIN — ATORVASTATIN CALCIUM 40 MILLIGRAM(S): 80 TABLET, FILM COATED ORAL at 22:07

## 2018-04-14 RX ADMIN — Medication 100 MILLIGRAM(S): at 22:07

## 2018-04-14 RX ADMIN — Medication 100 MILLIGRAM(S): at 12:54

## 2018-04-14 RX ADMIN — Medication 100 MILLIGRAM(S): at 05:17

## 2018-04-14 RX ADMIN — Medication 88 MICROGRAM(S): at 05:17

## 2018-04-14 RX ADMIN — OXYCODONE HYDROCHLORIDE 10 MILLIGRAM(S): 5 TABLET ORAL at 16:14

## 2018-04-14 RX ADMIN — OXYCODONE HYDROCHLORIDE 10 MILLIGRAM(S): 5 TABLET ORAL at 16:45

## 2018-04-14 RX ADMIN — Medication 1 SPRAY(S): at 11:18

## 2018-04-14 RX ADMIN — PANTOPRAZOLE SODIUM 40 MILLIGRAM(S): 20 TABLET, DELAYED RELEASE ORAL at 11:17

## 2018-04-14 RX ADMIN — Medication 62.5 MILLIMOLE(S): at 01:58

## 2018-04-14 RX ADMIN — AMLODIPINE BESYLATE 5 MILLIGRAM(S): 2.5 TABLET ORAL at 05:17

## 2018-04-14 NOTE — PROGRESS NOTE ADULT - ASSESSMENT
52M with pituitary mass s/p TSP for resection 4/12/18    -Neurochecks q1h  -LD at 5cc/hr  -DI watch  -MRI before discharge

## 2018-04-14 NOTE — PROGRESS NOTE ADULT - ASSESSMENT
52y POD #2 TSRP and resection of meningioma and pituitary adenoma with CSF leak repaired with nasal septal flap. No complaints of nasal drainage or epistaxis.

## 2018-04-14 NOTE — PROGRESS NOTE ADULT - ASSESSMENT
olfactory meningioma resection via transsphenoidal approach with cerebrospinal fluid status post abdominal fat graft and  lumbar drain placement  - LD at 5cc every other hour  - monitor pituitary function  - pain control  - synthroid for hypothyroidism

## 2018-04-14 NOTE — PHYSICAL THERAPY INITIAL EVALUATION ADULT - PERTINENT HX OF CURRENT PROBLEM, REHAB EVAL
Pt is a 53 y/o M admitted to [facility] on 4/12/18 with PMH of hypothyroidism, presented with vision loss in left eye last year, was referred for MRI of the head by ophthalmologist and found to have a pituitary tumor. 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

## 2018-04-14 NOTE — PROGRESS NOTE ADULT - SUBJECTIVE AND OBJECTIVE BOX
Patient seen and examined at bedside.    ICU Vital Signs Last 24 Hrs  T(C): 37.1 (14 Apr 2018 03:00), Max: 37.3 (13 Apr 2018 07:00)  T(F): 98.7 (14 Apr 2018 03:00), Max: 99.2 (13 Apr 2018 07:00)  HR: 80 (14 Apr 2018 04:00) (63 - 87)  BP: 137/73 (14 Apr 2018 04:00) (134/75 - 165/75)  BP(mean): 90 (14 Apr 2018 04:00) (88 - 108)  ABP: 172/64 (13 Apr 2018 19:00) (101/94 - 177/77)  ABP(mean): 95 (13 Apr 2018 19:00) (86 - 109)  RR: 16 (14 Apr 2018 04:00) (16 - 23)  SpO2: 97% (14 Apr 2018 04:00) (97% - 100%)    EXAM:    AOx3, Following Commands  CN: PERRL, EOMI, no facial droop  Motor: 5/5 throughout, no drift  Sensation intact to light touch  Reflexes: no clonus   No leak seen                          13.1   19.4  )-----------( 224      ( 13 Apr 2018 23:21 )             40.3     04-13    136  |  104  |  10  ----------------------------<  183<H>  4.3   |  20<L>  |  0.79    Ca    8.9      13 Apr 2018 23:21  Phos  1.6     04-13  Mg     2.2     04-13    TPro  6.9  /  Alb  3.9  /  TBili  0.7  /  DBili  x   /  AST  15  /  ALT  15  /  AlkPhos  53  04-13    I&O's Summary    12 Apr 2018 07:01  -  13 Apr 2018 07:00  --------------------------------------------------------  IN: 2226 mL / OUT: 2660 mL / NET: -434 mL    13 Apr 2018 07:01  -  14 Apr 2018 04:39  --------------------------------------------------------  IN: 3722.5 mL / OUT: 5510 mL / NET: -1787.5 mL

## 2018-04-14 NOTE — PROGRESS NOTE ADULT - ASSESSMENT
olfactory meningioma resection via transsphenoidal approach with cerebrospinal fluid status post abdominal fat graft and  lumbar drain placement  - LD at 5cc/hr  - monitor pituitary function  - pain control  - synthroid fro hypothyroidism

## 2018-04-14 NOTE — PROGRESS NOTE ADULT - SUBJECTIVE AND OBJECTIVE BOX
No signs/symptoms of cerebrospinal fluid leak. Lumbar drain now at 5cc every other hour.    Exam: Left temporal vision loss, otherwise intact

## 2018-04-14 NOTE — PHYSICAL THERAPY INITIAL EVALUATION ADULT - ADDITIONAL COMMENTS
Pt lives alone in an apartment with elevator access and a tub. Pt reports upon discharge he will be staying at his mother's private home with 4 steps to enter, 12 steps to bedroom on second floor. Prior to admission pt was independent with ADLs, IADLs, functional mobility, driving, working. works as a

## 2018-04-14 NOTE — PROGRESS NOTE ADULT - ASSESSMENT
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    NEURO:  neurochecks q2hr  CT Head in AM, MRI post-op  LD at 5cc/hr, plan form NSx  Pain control  Activity: [x] OOB as tolerated [] Bedrest [x] PT [x] OT [] PMNR    PULM:  Pituitary precautions (no incentive spirometry, no straws, no BIPAP)  Tolerates RA.    CV:  -150mmHg    RENAL:  IVL  Strict I+Os  Drink to thirst    GI:  Diet: advance diet as tolerated  GI prophylaxis [x] not indicated [] PPI [] other: Pantoprazole - home med  Bowel regimen [x] colace [x] senna [] other:    ENDO:   Goal euglycemia (-180)  Pituitary labs  Monitor for DI  Continue Levothyroxine.    HEME/ONC:  VTE prophylaxis: [x] SCDs [x] chemoprophylaxis SQL [] hold chemoprophylaxis due to: fresh postop [] high risk of DVT/PE on admission due to:    ID:  afebrile.    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    NEURO:  neurochecks q4hr  , MRI after LD D/C'd   LD at 5cc/hr, plan form NSx  Pain control  Activity: [x] OOB as tolerated [] Bedrest [x] PT [x] OT [] PMNR    PULM:  Pituitary precautions (no incentive spirometry, no straws, no BIPAP)  Tolerates RA.    CV:  -150mmHg    RENAL:  IVL  Strict I+Os  Drink to thirst    GI:  Diet: advance diet as tolerated  GI prophylaxis [x] not indicated [] PPI [] other: Pantoprazole - home med  Bowel regimen [x] colace [x] senna [] other:    ENDO:   Goal euglycemia (-180)  Pituitary labs  Monitor for DI  Continue Levothyroxine.    HEME/ONC:  VTE prophylaxis: [x] SCDs [x] chemoprophylaxis SQL [] hold chemoprophylaxis due to: fresh postop [] high risk of DVT/PE on admission due to:    ID:  afebrile.    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    NEURO:  neurochecks q4hr  , MRI after LD D/C'd   LD at 5cc/qohr, plan form NSx  Pain control  Activity: [x] OOB as tolerated [] Bedrest [x] PT [x] OT [] PMNR    PULM:  Pituitary precautions (no incentive spirometry, no straws, no BIPAP)  Tolerates RA.    CV:  -150mmHg    RENAL:  IVL  Strict I+Os  Drink to thirst    GI:  Diet: advance diet as tolerated  GI prophylaxis [x] not indicated [] PPI [] other: Pantoprazole - home med  Bowel regimen [x] colace [x] senna [] other:    ENDO:   Goal euglycemia (-180)  Pituitary labs  Monitor for DI  Continue Levothyroxine.    HEME/ONC:  VTE prophylaxis: [x] SCDs [x] chemoprophylaxis SQL [] hold chemoprophylaxis due to: fresh postop [] high risk of DVT/PE on admission due to:    ID:  afebrile.    SOCIAL/FAMILY:  [x] awaiting [] updated at bedside [] family meeting    CODE STATUS:  [x] Full Code    DISPOSITION:  [x] ICU

## 2018-04-14 NOTE — PROGRESS NOTE ADULT - SUBJECTIVE AND OBJECTIVE BOX
POD/STATUS POST/ENT ISSUE:  POD #2 for TSRP and resection of meningioma    INTERVAL HPI: 52y POD #2 TSRP and resection of meningioma c/b CSF leak, repaired with fat and nasoseptal flap. Pt denies any clear drainage or bleeding from nares, no salty taste, no PND. Pt with headache controlled with meds. No fevers overnight    Vital Signs Last 24 Hrs  T(C): 37.1 (14 Apr 2018 03:00), Max: 37.3 (13 Apr 2018 07:00)  T(F): 98.7 (14 Apr 2018 03:00), Max: 99.2 (13 Apr 2018 07:00)  HR: 85 (14 Apr 2018 06:00) (63 - 87)  BP: 139/79 (14 Apr 2018 06:00) (134/75 - 165/75)  BP(mean): 96 (14 Apr 2018 06:00) (88 - 106)  RR: 21 (14 Apr 2018 06:00) (16 - 21)  SpO2: 99% (14 Apr 2018 06:00) (97% - 100%)    PHYSICAL EXAM:  Gen: NAD, well-developed  Head: Normocephalic, Atraumatic  Eyes:  no scleral injection  Nose: Small amount of bloody discharge from B/L nares, no active bleeding, no CSF leak. Mustache drsg in place.   Mouth: Mucosa moist, tongue/uvula midline, oropharynx clear  Neck: Flat, supple, no lymphadenopathy, trachea midline, no masses  Resp:  no stridor  CV: no peripheral edema/cyanosis    LABS:                        13.1   19.4  )-----------( 224      ( 13 Apr 2018 23:21 )             40.3

## 2018-04-14 NOTE — PROGRESS NOTE ADULT - PROBLEM SELECTOR PLAN 1
- continue humidified face tent  - nasal saline, 2 sprays to b/l nares 4 times a day.  - monitor for signs of Epistaxis and CSF leak  - avoid nasal trauma, heavy lifting and bending at waist.  - Care a/p neurosurgery

## 2018-04-14 NOTE — PHYSICAL THERAPY INITIAL EVALUATION ADULT - DID THE PATIENT HAVE SURGERY?
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

## 2018-04-14 NOTE — PHYSICAL THERAPY INITIAL EVALUATION ADULT - DISCHARGE DISPOSITION, PT EVAL
DC home with outpt PT services, assist from family as needed, will cont to assess need for device, will negotiate stairs prior to DC home, DC plan to be emailed, CM/KADEN to be notified.

## 2018-04-15 LAB
ALBUMIN SERPL ELPH-MCNC: 3.8 G/DL — SIGNIFICANT CHANGE UP (ref 3.3–5)
ALP SERPL-CCNC: 60 U/L — SIGNIFICANT CHANGE UP (ref 40–120)
ALT FLD-CCNC: 13 U/L RC — SIGNIFICANT CHANGE UP (ref 10–45)
ANION GAP SERPL CALC-SCNC: 12 MMOL/L — SIGNIFICANT CHANGE UP (ref 5–17)
ANION GAP SERPL CALC-SCNC: 12 MMOL/L — SIGNIFICANT CHANGE UP (ref 5–17)
ANION GAP SERPL CALC-SCNC: 13 MMOL/L — SIGNIFICANT CHANGE UP (ref 5–17)
AST SERPL-CCNC: 13 U/L — SIGNIFICANT CHANGE UP (ref 10–40)
BASOPHILS # BLD AUTO: 0.1 K/UL — SIGNIFICANT CHANGE UP (ref 0–0.2)
BASOPHILS # BLD AUTO: 0.1 K/UL — SIGNIFICANT CHANGE UP (ref 0–0.2)
BASOPHILS NFR BLD AUTO: 0.7 % — SIGNIFICANT CHANGE UP (ref 0–2)
BASOPHILS NFR BLD AUTO: 0.7 % — SIGNIFICANT CHANGE UP (ref 0–2)
BILIRUB SERPL-MCNC: 0.8 MG/DL — SIGNIFICANT CHANGE UP (ref 0.2–1.2)
BUN SERPL-MCNC: 11 MG/DL — SIGNIFICANT CHANGE UP (ref 7–23)
BUN SERPL-MCNC: 12 MG/DL — SIGNIFICANT CHANGE UP (ref 7–23)
BUN SERPL-MCNC: 13 MG/DL — SIGNIFICANT CHANGE UP (ref 7–23)
CALCIUM SERPL-MCNC: 8.9 MG/DL — SIGNIFICANT CHANGE UP (ref 8.4–10.5)
CALCIUM SERPL-MCNC: 9.1 MG/DL — SIGNIFICANT CHANGE UP (ref 8.4–10.5)
CALCIUM SERPL-MCNC: 9.7 MG/DL — SIGNIFICANT CHANGE UP (ref 8.4–10.5)
CHLORIDE SERPL-SCNC: 96 MMOL/L — SIGNIFICANT CHANGE UP (ref 96–108)
CHLORIDE SERPL-SCNC: 96 MMOL/L — SIGNIFICANT CHANGE UP (ref 96–108)
CHLORIDE SERPL-SCNC: 99 MMOL/L — SIGNIFICANT CHANGE UP (ref 96–108)
CO2 SERPL-SCNC: 23 MMOL/L — SIGNIFICANT CHANGE UP (ref 22–31)
CO2 SERPL-SCNC: 24 MMOL/L — SIGNIFICANT CHANGE UP (ref 22–31)
CO2 SERPL-SCNC: 25 MMOL/L — SIGNIFICANT CHANGE UP (ref 22–31)
CORTIS AM PEAK SERPL-MCNC: 13.9 UG/DL — SIGNIFICANT CHANGE UP (ref 6–18.4)
CREAT SERPL-MCNC: 0.83 MG/DL — SIGNIFICANT CHANGE UP (ref 0.5–1.3)
CREAT SERPL-MCNC: 0.85 MG/DL — SIGNIFICANT CHANGE UP (ref 0.5–1.3)
CREAT SERPL-MCNC: 0.92 MG/DL — SIGNIFICANT CHANGE UP (ref 0.5–1.3)
EOSINOPHIL # BLD AUTO: 0.1 K/UL — SIGNIFICANT CHANGE UP (ref 0–0.5)
EOSINOPHIL # BLD AUTO: 0.2 K/UL — SIGNIFICANT CHANGE UP (ref 0–0.5)
EOSINOPHIL NFR BLD AUTO: 0.3 % — SIGNIFICANT CHANGE UP (ref 0–6)
EOSINOPHIL NFR BLD AUTO: 1.1 % — SIGNIFICANT CHANGE UP (ref 0–6)
GLUCOSE SERPL-MCNC: 139 MG/DL — HIGH (ref 70–99)
GLUCOSE SERPL-MCNC: 151 MG/DL — HIGH (ref 70–99)
GLUCOSE SERPL-MCNC: 156 MG/DL — HIGH (ref 70–99)
HCT VFR BLD CALC: 37.8 % — LOW (ref 39–50)
HCT VFR BLD CALC: 38.2 % — LOW (ref 39–50)
HGB BLD-MCNC: 13 G/DL — SIGNIFICANT CHANGE UP (ref 13–17)
HGB BLD-MCNC: 13.1 G/DL — SIGNIFICANT CHANGE UP (ref 13–17)
LYMPHOCYTES # BLD AUTO: 23.9 % — SIGNIFICANT CHANGE UP (ref 13–44)
LYMPHOCYTES # BLD AUTO: 24.4 % — SIGNIFICANT CHANGE UP (ref 13–44)
LYMPHOCYTES # BLD AUTO: 3.6 K/UL — HIGH (ref 1–3.3)
LYMPHOCYTES # BLD AUTO: 4.1 K/UL — HIGH (ref 1–3.3)
MAGNESIUM SERPL-MCNC: 1.9 MG/DL — SIGNIFICANT CHANGE UP (ref 1.6–2.6)
MAGNESIUM SERPL-MCNC: 1.9 MG/DL — SIGNIFICANT CHANGE UP (ref 1.6–2.6)
MAGNESIUM SERPL-MCNC: 2 MG/DL — SIGNIFICANT CHANGE UP (ref 1.6–2.6)
MCHC RBC-ENTMCNC: 31.3 PG — SIGNIFICANT CHANGE UP (ref 27–34)
MCHC RBC-ENTMCNC: 31.5 PG — SIGNIFICANT CHANGE UP (ref 27–34)
MCHC RBC-ENTMCNC: 34.2 GM/DL — SIGNIFICANT CHANGE UP (ref 32–36)
MCHC RBC-ENTMCNC: 34.3 GM/DL — SIGNIFICANT CHANGE UP (ref 32–36)
MCV RBC AUTO: 91.5 FL — SIGNIFICANT CHANGE UP (ref 80–100)
MCV RBC AUTO: 92 FL — SIGNIFICANT CHANGE UP (ref 80–100)
MONOCYTES # BLD AUTO: 1 K/UL — HIGH (ref 0–0.9)
MONOCYTES # BLD AUTO: 1.4 K/UL — HIGH (ref 0–0.9)
MONOCYTES NFR BLD AUTO: 6.6 % — SIGNIFICANT CHANGE UP (ref 2–14)
MONOCYTES NFR BLD AUTO: 8 % — SIGNIFICANT CHANGE UP (ref 2–14)
NEUTROPHILS # BLD AUTO: 11.6 K/UL — HIGH (ref 1.8–7.4)
NEUTROPHILS # BLD AUTO: 9.8 K/UL — HIGH (ref 1.8–7.4)
NEUTROPHILS NFR BLD AUTO: 67.1 % — SIGNIFICANT CHANGE UP (ref 43–77)
NEUTROPHILS NFR BLD AUTO: 67.2 % — SIGNIFICANT CHANGE UP (ref 43–77)
PHOSPHATE SERPL-MCNC: 1.6 MG/DL — LOW (ref 2.5–4.5)
PHOSPHATE SERPL-MCNC: 2 MG/DL — LOW (ref 2.5–4.5)
PHOSPHATE SERPL-MCNC: 2.8 MG/DL — SIGNIFICANT CHANGE UP (ref 2.5–4.5)
PLATELET # BLD AUTO: 208 K/UL — SIGNIFICANT CHANGE UP (ref 150–400)
PLATELET # BLD AUTO: 230 K/UL — SIGNIFICANT CHANGE UP (ref 150–400)
POTASSIUM SERPL-MCNC: 3.9 MMOL/L — SIGNIFICANT CHANGE UP (ref 3.5–5.3)
POTASSIUM SERPL-MCNC: 4 MMOL/L — SIGNIFICANT CHANGE UP (ref 3.5–5.3)
POTASSIUM SERPL-MCNC: 4 MMOL/L — SIGNIFICANT CHANGE UP (ref 3.5–5.3)
POTASSIUM SERPL-SCNC: 3.9 MMOL/L — SIGNIFICANT CHANGE UP (ref 3.5–5.3)
POTASSIUM SERPL-SCNC: 4 MMOL/L — SIGNIFICANT CHANGE UP (ref 3.5–5.3)
POTASSIUM SERPL-SCNC: 4 MMOL/L — SIGNIFICANT CHANGE UP (ref 3.5–5.3)
PROT SERPL-MCNC: 7.8 G/DL — SIGNIFICANT CHANGE UP (ref 6–8.3)
RBC # BLD: 4.14 M/UL — LOW (ref 4.2–5.8)
RBC # BLD: 4.15 M/UL — LOW (ref 4.2–5.8)
RBC # FLD: 12.2 % — SIGNIFICANT CHANGE UP (ref 10.3–14.5)
RBC # FLD: 12.4 % — SIGNIFICANT CHANGE UP (ref 10.3–14.5)
SODIUM SERPL-SCNC: 133 MMOL/L — LOW (ref 135–145)
SODIUM SERPL-SCNC: 133 MMOL/L — LOW (ref 135–145)
SODIUM SERPL-SCNC: 134 MMOL/L — LOW (ref 135–145)
WBC # BLD: 14.6 K/UL — HIGH (ref 3.8–10.5)
WBC # BLD: 17.3 K/UL — HIGH (ref 3.8–10.5)
WBC # FLD AUTO: 14.6 K/UL — HIGH (ref 3.8–10.5)
WBC # FLD AUTO: 17.3 K/UL — HIGH (ref 3.8–10.5)

## 2018-04-15 PROCEDURE — 99233 SBSQ HOSP IP/OBS HIGH 50: CPT

## 2018-04-15 RX ORDER — POLYETHYLENE GLYCOL 3350 17 G/17G
17 POWDER, FOR SOLUTION ORAL DAILY
Qty: 0 | Refills: 0 | Status: DISCONTINUED | OUTPATIENT
Start: 2018-04-15 | End: 2018-04-16

## 2018-04-15 RX ORDER — MAGNESIUM SULFATE 500 MG/ML
2 VIAL (ML) INJECTION ONCE
Qty: 0 | Refills: 0 | Status: COMPLETED | OUTPATIENT
Start: 2018-04-15 | End: 2018-04-16

## 2018-04-15 RX ADMIN — OXYCODONE HYDROCHLORIDE 10 MILLIGRAM(S): 5 TABLET ORAL at 20:00

## 2018-04-15 RX ADMIN — PANTOPRAZOLE SODIUM 40 MILLIGRAM(S): 20 TABLET, DELAYED RELEASE ORAL at 12:04

## 2018-04-15 RX ADMIN — Medication 62.5 MILLIMOLE(S): at 05:33

## 2018-04-15 RX ADMIN — Medication 100 MILLIGRAM(S): at 20:40

## 2018-04-15 RX ADMIN — AMLODIPINE BESYLATE 5 MILLIGRAM(S): 2.5 TABLET ORAL at 05:27

## 2018-04-15 RX ADMIN — Medication 650 MILLIGRAM(S): at 08:15

## 2018-04-15 RX ADMIN — Medication 100 MILLIGRAM(S): at 05:26

## 2018-04-15 RX ADMIN — POLYETHYLENE GLYCOL 3350 17 GRAM(S): 17 POWDER, FOR SOLUTION ORAL at 12:03

## 2018-04-15 RX ADMIN — Medication 650 MILLIGRAM(S): at 07:33

## 2018-04-15 RX ADMIN — ATORVASTATIN CALCIUM 40 MILLIGRAM(S): 80 TABLET, FILM COATED ORAL at 20:40

## 2018-04-15 RX ADMIN — ENOXAPARIN SODIUM 40 MILLIGRAM(S): 100 INJECTION SUBCUTANEOUS at 17:15

## 2018-04-15 RX ADMIN — OXYCODONE HYDROCHLORIDE 10 MILLIGRAM(S): 5 TABLET ORAL at 00:00

## 2018-04-15 RX ADMIN — OXYCODONE HYDROCHLORIDE 10 MILLIGRAM(S): 5 TABLET ORAL at 13:16

## 2018-04-15 RX ADMIN — Medication 88 MICROGRAM(S): at 05:26

## 2018-04-15 RX ADMIN — Medication 100 MILLIGRAM(S): at 13:13

## 2018-04-15 RX ADMIN — OXYCODONE HYDROCHLORIDE 10 MILLIGRAM(S): 5 TABLET ORAL at 00:30

## 2018-04-15 RX ADMIN — OXYCODONE HYDROCHLORIDE 10 MILLIGRAM(S): 5 TABLET ORAL at 14:00

## 2018-04-15 RX ADMIN — OXYCODONE HYDROCHLORIDE 10 MILLIGRAM(S): 5 TABLET ORAL at 20:30

## 2018-04-15 RX ADMIN — Medication 1 SPRAY(S): at 12:04

## 2018-04-15 NOTE — PROGRESS NOTE ADULT - ASSESSMENT
olfactory meningioma resection via transsphenoidal approach with cerebrospinal fluid status post abdominal fat graft and  lumbar drain placement  - LD at 5cc every other hour, possible clamp tomorrow  - monitor pituitary function  - pain control  - synthroid for hypothyroidism

## 2018-04-15 NOTE — PROGRESS NOTE ADULT - SUBJECTIVE AND OBJECTIVE BOX
No signs/symptoms of cerebrospinal fluid leak. Lumbar drain now at 5cc every other hour.    Exam: Left temporal vision improving, otherwise intact    Assessment and Plan:   · Assessment		  olfactory meningioma resection via transsphenoidal approach with cerebrospinal fluid status post abdominal fat graft and  lumbar drain placement  - LD at 5cc every other hour  - monitor pituitary function  - pain control  - synthroid for hypothyroidism

## 2018-04-15 NOTE — PROGRESS NOTE ADULT - SUBJECTIVE AND OBJECTIVE BOX
HPI:  51 y/o M with PMH of hypothyroidism, presented with vision loss in left eye last year, was referred for MRI of the head by ophthalmologist and found to have a pituitary tumor.  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    VITALS: reviewed  LABS: reviewed  IMAGING:   Recent imaging studies were reviewed  MEDICATIONS: reviewed    EXAMINATION:  General:  calm, cooperative.  HEENT:  EOMI, PERRLA, L temporal hemianopsia. Face symmetric.  Neuro:  awake, alert, oriented x 3, follows commands, moves all extremities, 5/5 throughout. Sensation intact including facial.   Cards:  RRR  Respiratory:  no respiratory distress  Abdomen:  soft  Extremities:  no edema  Skin:  warm/dry

## 2018-04-15 NOTE — PROGRESS NOTE ADULT - SUBJECTIVE AND OBJECTIVE BOX
POD/STATUS POST/ENT ISSUE:  POD #3 for TSRP and resection of meningioma    INTERVAL HPI: 52y POD #3 TSRP and resection of meningioma c/b CSF leak, repaired with fat graft and nasoseptal flap. Pt denies any clear drainage or bleeding from nares, no salty taste, no PND. C/o dry throat,  Pt with headache controlled with meds.  LD in place 65ml total        Vital Signs Last 24 Hrs  T(C): 37.1 (15 Apr 2018 03:00), Max: 37.2 (14 Apr 2018 15:00)  T(F): 98.8 (15 Apr 2018 03:00), Max: 99 (14 Apr 2018 15:00)  HR: 76 (15 Apr 2018 03:00) (67 - 95)  BP: 139/77 (15 Apr 2018 03:00) (135/71 - 156/83)  BP(mean): 94 (15 Apr 2018 03:00) (88 - 107)  RR: 18 (15 Apr 2018 03:00) (9 - 20)  SpO2: 97% (15 Apr 2018 03:00) (96% - 100%)    PHYSICAL EXAM:  Gen: NAD, well-developed  Head: Normocephalic, Atraumatic  Eyes: PERRL, EOMI, no scleral injection  Nose: Nares bilaterally patent, with scant serosang discharge no obvious bleeding or rhinorrhea mustache dressing replaced  Mouth: Mucosa dry, tongue/uvula midline, soft palate with dry blood, no erythema, ecchymosis, or active bleeding  Neck: Flat, supple, no lymphadenopathy, trachea midline, no masses  Resp: breathing easily, no stridor      LABS:                        13.1   17.3  )-----------( 208      ( 15 Apr 2018 00:55 )             38.2

## 2018-04-15 NOTE — PROGRESS NOTE ADULT - PROBLEM SELECTOR PLAN 1
continue to monitor for CSF leak, LD in place   - pain control   -NSCU care  - NS qid, leave ocean spray at bedside.

## 2018-04-15 NOTE — PROGRESS NOTE ADULT - ASSESSMENT
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    NEURO:  neurochecks q4hr  MRI after LD D/C'd   LD at 5cc/qohr, day 3 today, initial plan was 4-5 days of drainage, f/u update  Pain control  Activity: [x] OOB as tolerated [] Bedrest [x] PT [x] OT [] PMNR    PULM:  Pituitary precautions (no incentive spirometry, no straws, no BIPAP)  Tolerates RA.    CV:  -150mmHg    RENAL:  IVL  Strict I+Os  Drink to thirst    GI:  Diet: advance diet as tolerated  GI prophylaxis [x] not indicated [] PPI [] other: Pantoprazole - home med  Bowel regimen [x] colace [x] senna [] other:    ENDO:   Goal euglycemia (-180)  Pituitary labs  Monitor for DI  Continue Levothyroxine.    HEME/ONC:  VTE prophylaxis: [x] SCDs [x] chemoprophylaxis SQL [] hold chemoprophylaxis due to: fresh postop [] high risk of DVT/PE on admission due to:    ID:  afebrile.    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    NEURO:  neurochecks q4hr  MRI after LD D/C'd   LD at 5cc/qohr, day 3 today, initial plan was 4-5 days of drainage, f/u update  Pain control  Activity: [x] OOB as tolerated [] Bedrest [x] PT [x] OT [] PMNR    PULM:  Pituitary precautions (no incentive spirometry, no straws, no BIPAP)  Tolerates RA.    CV:  -150mmHg    RENAL:  IVL  Strict I+Os  Drink to thirst    GI:  Diet: advance diet as tolerated  GI prophylaxis [x] not indicated [] PPI [] other: Pantoprazole - home med  Bowel regimen [x] colace [x] senna [] other:    ENDO:   Goal euglycemia (-180)  Pituitary labs  Monitor for DI  Continue Levothyroxine.  Suippl phos - hypophosphatemia    HEME/ONC:  VTE prophylaxis: [x] SCDs [x] chemoprophylaxis SQL [] hold chemoprophylaxis due to: fresh postop [] high risk of DVT/PE on admission due to:    ID:  afebrile.    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    NEURO:  neurochecks q4hr  MRI after LD D/C'd   LD at 5cc/qohr, day 3 today, initial plan was 4-5 days of drainage, f/u update  Pain control  Situational anxiety - discuss need to increase prozac if persist rather than receiving the xanax prn .   Activity: [x] OOB as tolerated [] Bedrest [x] PT [x] OT [] PMNR    PULM:  Pituitary precautions (no incentive spirometry, no straws, no BIPAP)  Tolerates RA.    CV:  -150mmHg    RENAL:  IVL  Strict I+Os  Drink to thirst    GI:  Diet: advance diet as tolerated  GI prophylaxis [x] not indicated [] PPI [] other: Pantoprazole - home med  Bowel regimen [x] colace [x] senna [] other: MIRalax    ENDO:   Goal euglycemia (-180)  Pituitary labs  Monitor for DI  Continue Levothyroxine.  Suippl phos - hypophosphatemia    HEME/ONC:  VTE prophylaxis: [x] SCDs [x] chemoprophylaxis SQL [] hold chemoprophylaxis due to: fresh postop [] high risk of DVT/PE on admission due to:    ID:  afebrile.    SOCIAL/FAMILY:  [x] awaiting [] updated at bedside [] family meeting    CODE STATUS:  [x] Full Code    DISPOSITION:  [x] ICU     Time spent 15 min

## 2018-04-15 NOTE — PROGRESS NOTE ADULT - ASSESSMENT
52y POD #3 s/pTSRP adenoma and resection of meningioma CSF leak repaired with nasal septal flap. No complaints of nasal drainage or epistaxis. No signs of CSF leak on exam, headache controlled with pain meds, LD in palce

## 2018-04-15 NOTE — PROGRESS NOTE ADULT - SUBJECTIVE AND OBJECTIVE BOX
No signs/symptoms of cerebrospinal fluid leak. Continues to have lumbar drain draining 5cc every other hour.    Exam: Left temporal vision loss, otherwise intact

## 2018-04-16 LAB
ANION GAP SERPL CALC-SCNC: 13 MMOL/L — SIGNIFICANT CHANGE UP (ref 5–17)
BASOPHILS # BLD AUTO: 0.1 K/UL — SIGNIFICANT CHANGE UP (ref 0–0.2)
BASOPHILS NFR BLD AUTO: 0.7 % — SIGNIFICANT CHANGE UP (ref 0–2)
BUN SERPL-MCNC: 14 MG/DL — SIGNIFICANT CHANGE UP (ref 7–23)
CALCIUM SERPL-MCNC: 9.2 MG/DL — SIGNIFICANT CHANGE UP (ref 8.4–10.5)
CHLORIDE SERPL-SCNC: 96 MMOL/L — SIGNIFICANT CHANGE UP (ref 96–108)
CO2 SERPL-SCNC: 23 MMOL/L — SIGNIFICANT CHANGE UP (ref 22–31)
CREAT SERPL-MCNC: 0.88 MG/DL — SIGNIFICANT CHANGE UP (ref 0.5–1.3)
EOSINOPHIL # BLD AUTO: 0.2 K/UL — SIGNIFICANT CHANGE UP (ref 0–0.5)
EOSINOPHIL NFR BLD AUTO: 2.4 % — SIGNIFICANT CHANGE UP (ref 0–6)
GLUCOSE SERPL-MCNC: 152 MG/DL — HIGH (ref 70–99)
HCT VFR BLD CALC: 40.2 % — SIGNIFICANT CHANGE UP (ref 39–50)
HGB BLD-MCNC: 13.4 G/DL — SIGNIFICANT CHANGE UP (ref 13–17)
LYMPHOCYTES # BLD AUTO: 3.1 K/UL — SIGNIFICANT CHANGE UP (ref 1–3.3)
LYMPHOCYTES # BLD AUTO: 30 % — SIGNIFICANT CHANGE UP (ref 13–44)
MAGNESIUM SERPL-MCNC: 2.1 MG/DL — SIGNIFICANT CHANGE UP (ref 1.6–2.6)
MCHC RBC-ENTMCNC: 30.5 PG — SIGNIFICANT CHANGE UP (ref 27–34)
MCHC RBC-ENTMCNC: 33.4 GM/DL — SIGNIFICANT CHANGE UP (ref 32–36)
MCV RBC AUTO: 91.1 FL — SIGNIFICANT CHANGE UP (ref 80–100)
MONOCYTES # BLD AUTO: 1 K/UL — HIGH (ref 0–0.9)
MONOCYTES NFR BLD AUTO: 9.4 % — SIGNIFICANT CHANGE UP (ref 2–14)
NEUTROPHILS # BLD AUTO: 6 K/UL — SIGNIFICANT CHANGE UP (ref 1.8–7.4)
NEUTROPHILS NFR BLD AUTO: 57.5 % — SIGNIFICANT CHANGE UP (ref 43–77)
PHOSPHATE SERPL-MCNC: 3.1 MG/DL — SIGNIFICANT CHANGE UP (ref 2.5–4.5)
PLATELET # BLD AUTO: 309 K/UL — SIGNIFICANT CHANGE UP (ref 150–400)
POTASSIUM SERPL-MCNC: 4.3 MMOL/L — SIGNIFICANT CHANGE UP (ref 3.5–5.3)
POTASSIUM SERPL-SCNC: 4.3 MMOL/L — SIGNIFICANT CHANGE UP (ref 3.5–5.3)
RBC # BLD: 4.41 M/UL — SIGNIFICANT CHANGE UP (ref 4.2–5.8)
RBC # FLD: 12 % — SIGNIFICANT CHANGE UP (ref 10.3–14.5)
SODIUM SERPL-SCNC: 132 MMOL/L — LOW (ref 135–145)
WBC # BLD: 10.4 K/UL — SIGNIFICANT CHANGE UP (ref 3.8–10.5)
WBC # FLD AUTO: 10.4 K/UL — SIGNIFICANT CHANGE UP (ref 3.8–10.5)

## 2018-04-16 PROCEDURE — 99024 POSTOP FOLLOW-UP VISIT: CPT

## 2018-04-16 PROCEDURE — 99232 SBSQ HOSP IP/OBS MODERATE 35: CPT

## 2018-04-16 RX ORDER — POLYETHYLENE GLYCOL 3350 17 G/17G
17 POWDER, FOR SOLUTION ORAL EVERY 12 HOURS
Qty: 0 | Refills: 0 | Status: DISCONTINUED | OUTPATIENT
Start: 2018-04-16 | End: 2018-04-20

## 2018-04-16 RX ORDER — AMLODIPINE BESYLATE 2.5 MG/1
5 TABLET ORAL ONCE
Qty: 0 | Refills: 0 | Status: COMPLETED | OUTPATIENT
Start: 2018-04-16 | End: 2018-04-16

## 2018-04-16 RX ORDER — AMLODIPINE BESYLATE 2.5 MG/1
10 TABLET ORAL DAILY
Qty: 0 | Refills: 0 | Status: DISCONTINUED | OUTPATIENT
Start: 2018-04-16 | End: 2018-04-20

## 2018-04-16 RX ADMIN — OXYCODONE HYDROCHLORIDE 10 MILLIGRAM(S): 5 TABLET ORAL at 07:45

## 2018-04-16 RX ADMIN — Medication 62.5 MILLIMOLE(S): at 04:54

## 2018-04-16 RX ADMIN — AMLODIPINE BESYLATE 5 MILLIGRAM(S): 2.5 TABLET ORAL at 16:08

## 2018-04-16 RX ADMIN — Medication 100 MILLIGRAM(S): at 06:09

## 2018-04-16 RX ADMIN — Medication 1 SPRAY(S): at 12:03

## 2018-04-16 RX ADMIN — OXYCODONE HYDROCHLORIDE 10 MILLIGRAM(S): 5 TABLET ORAL at 20:31

## 2018-04-16 RX ADMIN — Medication 88 MICROGRAM(S): at 06:09

## 2018-04-16 RX ADMIN — OXYCODONE HYDROCHLORIDE 10 MILLIGRAM(S): 5 TABLET ORAL at 20:01

## 2018-04-16 RX ADMIN — AMLODIPINE BESYLATE 5 MILLIGRAM(S): 2.5 TABLET ORAL at 06:10

## 2018-04-16 RX ADMIN — Medication 100 MILLIGRAM(S): at 21:14

## 2018-04-16 RX ADMIN — ENOXAPARIN SODIUM 40 MILLIGRAM(S): 100 INJECTION SUBCUTANEOUS at 17:24

## 2018-04-16 RX ADMIN — ATORVASTATIN CALCIUM 40 MILLIGRAM(S): 80 TABLET, FILM COATED ORAL at 21:14

## 2018-04-16 RX ADMIN — OXYCODONE HYDROCHLORIDE 10 MILLIGRAM(S): 5 TABLET ORAL at 07:03

## 2018-04-16 RX ADMIN — POLYETHYLENE GLYCOL 3350 17 GRAM(S): 17 POWDER, FOR SOLUTION ORAL at 12:03

## 2018-04-16 RX ADMIN — Medication 50 GRAM(S): at 00:32

## 2018-04-16 RX ADMIN — Medication 100 MILLIGRAM(S): at 13:35

## 2018-04-16 NOTE — PROGRESS NOTE ADULT - SUBJECTIVE AND OBJECTIVE BOX
Patient seen and examined at bedside.    ICU Vital Signs Last 24 Hrs  T(C): 37.2 (15 Apr 2018 23:00), Max: 37.2 (15 Apr 2018 07:00)  T(F): 99 (15 Apr 2018 23:00), Max: 99 (15 Apr 2018 07:00)  HR: 74 (15 Apr 2018 23:00) (61 - 76)  BP: 154/87 (15 Apr 2018 23:00) (137/80 - 156/81)  BP(mean): 106 (15 Apr 2018 23:00) (74 - 106)  ABP: --  ABP(mean): --  RR: 17 (15 Apr 2018 23:00) (14 - 23)  SpO2: 100% (15 Apr 2018 23:00) (97% - 100%)    EXAM:    AOx3, Following Commands  CN: PERRL, EOMI, no facial droop  Motor: 5/5 throughout, no drift  Sensation intact to light touch  Reflexes: no clonus   No leak seen

## 2018-04-16 NOTE — PROGRESS NOTE ADULT - SUBJECTIVE AND OBJECTIVE BOX
Awake, Alert  Vision Stable  No CSF rhinorrhea  LD at 5cc every other hour    Plan:  Clamp LD tomorrow

## 2018-04-16 NOTE — PROGRESS NOTE ADULT - ASSESSMENT
52M with pituitary mass s/p TSP for resection 4/12/18    -Neurochecks q1h  -LD at 5cc/qohr, possible clamp in AM  -DI watch  -MRI before discharge

## 2018-04-16 NOTE — PROGRESS NOTE ADULT - SUBJECTIVE AND OBJECTIVE BOX
Progress Note Adult-NSICU Fellow [Charted Location: [facility] 02] [Authored: 16-Apr-2018 06:53]- for Visit: [id number], Complete, Entered, Signed in Full, General    Progress Note:   · Provider Specialty	NSICU	      · Subjective and Objective: 	  HPI:    OVERNIGHT EVENTS:   No acute events overnight.    VITALS:  T(C): , Max: 37.2 (04-15-18 @ 07:00)  HR:  (61 - 74)  BP:  (137/80 - 156/81)  ABP: --  RR:  (14 - 23)  SpO2:  (97% - 100%)  Wt(kg): --      LABS:  Na: 133 (04-15 @ 20:49), 133 (04-15 @ 12:30), 134 (04-15 @ 00:55), 137 (04-14 @ 13:07), 137 (04-14 @ 05:07), 136 (04-13 @ 23:21), 140 (04-13 @ 17:22), 138 (04-13 @ 11:54), 135 (04-13 @ 07:00)  K: 4.0 (04-15 @ 20:49), 3.9 (04-15 @ 12:30), 4.0 (04-15 @ 00:55), 4.0 (04-14 @ 13:07), 4.3 (04-14 @ 05:07), 4.3 (04-13 @ 23:21), 4.2 (04-13 @ 17:22), 4.2 (04-13 @ 11:54), 4.0 (04-13 @ 07:00)  Cl: 96 (04-15 @ 20:49), 96 (04-15 @ 12:30), 99 (04-15 @ 00:55), 103 (04-14 @ 13:07), 105 (04-14 @ 05:07), 104 (04-13 @ 23:21), 107 (04-13 @ 17:22), 105 (04-13 @ 11:54), 102 (04-13 @ 07:00)  CO2: 24 (04-15 @ 20:49), 25 (04-15 @ 12:30), 23 (04-15 @ 00:55), 22 (04-14 @ 13:07), 21 (04-14 @ 05:07), 20 (04-13 @ 23:21), 22 (04-13 @ 17:22), 22 (04-13 @ 11:54), 21 (04-13 @ 07:00)  BUN: 12 (04-15 @ 20:49), 13 (04-15 @ 12:30), 11 (04-15 @ 00:55), 12 (04-14 @ 13:07), 11 (04-14 @ 05:07), 10 (04-13 @ 23:21), 11 (04-13 @ 17:22), 11 (04-13 @ 11:54), 10 (04-13 @ 07:00)  Cr: 0.85 (04-15 @ 20:49), 0.83 (04-15 @ 12:30), 0.92 (04-15 @ 00:55), 0.88 (04-14 @ 13:07), 0.88 (04-14 @ 05:07), 0.79 (04-13 @ 23:21), 0.83 (04-13 @ 17:22), 0.84 (04-13 @ 11:54), 0.79 (04-13 @ 07:00)  Glu:     Hgb: 13.0 (04-15 @ 20:49), 13.1 (04-15 @ 00:55), 13.1 (04-13 @ 23:21)  Hct: 37.8 (04-15 @ 20:49), 38.2 (04-15 @ 00:55), 40.3 (04-13 @ 23:21)  WBC: 14.6 (04-15 @ 20:49), 17.3 (04-15 @ 00:55), 19.4 (04-13 @ 23:21)  Plt: 230 (04-15 @ 20:49), 208 (04-15 @ 00:55), 224 (04-13 @ 23:21)      IMAGING:   Recent imaging studies were reviewed.    MEDICATIONS:  acetaminophen   Tablet 650 milliGRAM(s) Oral every 6 hours PRN  acetaminophen   Tablet. 650 milliGRAM(s) Oral every 6 hours PRN  amLODIPine   Tablet 5 milliGRAM(s) Oral daily  atorvastatin 40 milliGRAM(s) Oral at bedtime  docusate sodium 100 milliGRAM(s) Oral three times a day  enoxaparin Injectable 40 milliGRAM(s) SubCutaneous <User Schedule>  levothyroxine 88 MICROGram(s) Oral daily  ondansetron Injectable 4 milliGRAM(s) IV Push every 6 hours PRN  oxyCODONE    IR 5 milliGRAM(s) Oral every 4 hours PRN  oxyCODONE    IR 10 milliGRAM(s) Oral every 4 hours PRN  polyethylene glycol 3350 17 Gram(s) Oral daily  sodium chloride 0.65% Nasal 1 Spray(s) Both Nostrils daily    EXAMINATION:  General:  calm  HEENT:  MMM  Neuro:  awake, alert, oriented x 3, follows commands, moves all extremities, VF intact bilaterally  Cards:  RRR  Respiratory:  no respiratory distress  Adomen:  soft  Extremities:  no edema  Skin:  warm/dry Progress Note Adult-NSICU Fellow [Charted Location: [facility] 02] [Authored: 16-Apr-2018 06:53]- for Visit: [id number], Complete, Entered, Signed in Full, General    Progress Note:   · Provider Specialty	NSICU	      · Subjective and Objective: 	  HPI: 51 y/o M PMH vision loss left eye last year, was referred for MRI of the head by opthamologist and found to have a pituitary tumor.  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    OVERNIGHT EVENTS:   No acute events overnight.    VITALS:  T(C): , Max: 37.2 (04-15-18 @ 07:00)  HR:  (61 - 74)  BP:  (137/80 - 156/81)  ABP: --  RR:  (14 - 23)  SpO2:  (97% - 100%)  Wt(kg): --      LABS:  Na: 133 (04-15 @ 20:49), 133 (04-15 @ 12:30), 134 (04-15 @ 00:55), 137 (04-14 @ 13:07), 137 (04-14 @ 05:07), 136 (04-13 @ 23:21), 140 (04-13 @ 17:22), 138 (04-13 @ 11:54), 135 (04-13 @ 07:00)  K: 4.0 (04-15 @ 20:49), 3.9 (04-15 @ 12:30), 4.0 (04-15 @ 00:55), 4.0 (04-14 @ 13:07), 4.3 (04-14 @ 05:07), 4.3 (04-13 @ 23:21), 4.2 (04-13 @ 17:22), 4.2 (04-13 @ 11:54), 4.0 (04-13 @ 07:00)  Cl: 96 (04-15 @ 20:49), 96 (04-15 @ 12:30), 99 (04-15 @ 00:55), 103 (04-14 @ 13:07), 105 (04-14 @ 05:07), 104 (04-13 @ 23:21), 107 (04-13 @ 17:22), 105 (04-13 @ 11:54), 102 (04-13 @ 07:00)  CO2: 24 (04-15 @ 20:49), 25 (04-15 @ 12:30), 23 (04-15 @ 00:55), 22 (04-14 @ 13:07), 21 (04-14 @ 05:07), 20 (04-13 @ 23:21), 22 (04-13 @ 17:22), 22 (04-13 @ 11:54), 21 (04-13 @ 07:00)  BUN: 12 (04-15 @ 20:49), 13 (04-15 @ 12:30), 11 (04-15 @ 00:55), 12 (04-14 @ 13:07), 11 (04-14 @ 05:07), 10 (04-13 @ 23:21), 11 (04-13 @ 17:22), 11 (04-13 @ 11:54), 10 (04-13 @ 07:00)  Cr: 0.85 (04-15 @ 20:49), 0.83 (04-15 @ 12:30), 0.92 (04-15 @ 00:55), 0.88 (04-14 @ 13:07), 0.88 (04-14 @ 05:07), 0.79 (04-13 @ 23:21), 0.83 (04-13 @ 17:22), 0.84 (04-13 @ 11:54), 0.79 (04-13 @ 07:00)  Glu:     Hgb: 13.0 (04-15 @ 20:49), 13.1 (04-15 @ 00:55), 13.1 (04-13 @ 23:21)  Hct: 37.8 (04-15 @ 20:49), 38.2 (04-15 @ 00:55), 40.3 (04-13 @ 23:21)  WBC: 14.6 (04-15 @ 20:49), 17.3 (04-15 @ 00:55), 19.4 (04-13 @ 23:21)  Plt: 230 (04-15 @ 20:49), 208 (04-15 @ 00:55), 224 (04-13 @ 23:21)      IMAGING:   Recent imaging studies were reviewed.    MEDICATIONS:  acetaminophen   Tablet 650 milliGRAM(s) Oral every 6 hours PRN  acetaminophen   Tablet. 650 milliGRAM(s) Oral every 6 hours PRN  amLODIPine   Tablet 5 milliGRAM(s) Oral daily  atorvastatin 40 milliGRAM(s) Oral at bedtime  docusate sodium 100 milliGRAM(s) Oral three times a day  enoxaparin Injectable 40 milliGRAM(s) SubCutaneous <User Schedule>  levothyroxine 88 MICROGram(s) Oral daily  ondansetron Injectable 4 milliGRAM(s) IV Push every 6 hours PRN  oxyCODONE    IR 5 milliGRAM(s) Oral every 4 hours PRN  oxyCODONE    IR 10 milliGRAM(s) Oral every 4 hours PRN  polyethylene glycol 3350 17 Gram(s) Oral daily  sodium chloride 0.65% Nasal 1 Spray(s) Both Nostrils daily    EXAMINATION:  General:  externam nasal dressing in place, dry.  HEENT:  MMM  Neuro:  awake, alert, oriented x 3, follows commands, moves all extremities, VF intact bilaterally  Cards:  RRR  Respiratory:  no respiratory distress  Adomen:  soft  Extremities:  no edema  Skin:  warm/dry Progress Note Adult-NSICU Fellow [Charted Location: [facility] 02] [Authored: 16-Apr-2018 06:53]- for Visit: [id number], Complete, Entered, Signed in Full, General    Progress Note:   · Provider Specialty	NSICU	      · Subjective and Objective: 	  HPI: 51 y/o M PMH vision loss left eye last year, was referred for MRI of the head by opthamologist and found to have a pituitary tumor.  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    OVERNIGHT EVENTS:   No acute events overnight.    VITALS:  T(C): , Max: 37.2 (04-15-18 @ 07:00)  HR:  (61 - 74)  BP:  (137/80 - 156/81)  ABP: --  RR:  (14 - 23)  SpO2:  (97% - 100%)  Wt(kg): --      LABS:  Na: 133 (04-15 @ 20:49), 133 (04-15 @ 12:30), 134 (04-15 @ 00:55), 137 (04-14 @ 13:07), 137 (04-14 @ 05:07), 136 (04-13 @ 23:21), 140 (04-13 @ 17:22), 138 (04-13 @ 11:54), 135 (04-13 @ 07:00)  K: 4.0 (04-15 @ 20:49), 3.9 (04-15 @ 12:30), 4.0 (04-15 @ 00:55), 4.0 (04-14 @ 13:07), 4.3 (04-14 @ 05:07), 4.3 (04-13 @ 23:21), 4.2 (04-13 @ 17:22), 4.2 (04-13 @ 11:54), 4.0 (04-13 @ 07:00)  Cl: 96 (04-15 @ 20:49), 96 (04-15 @ 12:30), 99 (04-15 @ 00:55), 103 (04-14 @ 13:07), 105 (04-14 @ 05:07), 104 (04-13 @ 23:21), 107 (04-13 @ 17:22), 105 (04-13 @ 11:54), 102 (04-13 @ 07:00)  CO2: 24 (04-15 @ 20:49), 25 (04-15 @ 12:30), 23 (04-15 @ 00:55), 22 (04-14 @ 13:07), 21 (04-14 @ 05:07), 20 (04-13 @ 23:21), 22 (04-13 @ 17:22), 22 (04-13 @ 11:54), 21 (04-13 @ 07:00)  BUN: 12 (04-15 @ 20:49), 13 (04-15 @ 12:30), 11 (04-15 @ 00:55), 12 (04-14 @ 13:07), 11 (04-14 @ 05:07), 10 (04-13 @ 23:21), 11 (04-13 @ 17:22), 11 (04-13 @ 11:54), 10 (04-13 @ 07:00)  Cr: 0.85 (04-15 @ 20:49), 0.83 (04-15 @ 12:30), 0.92 (04-15 @ 00:55), 0.88 (04-14 @ 13:07), 0.88 (04-14 @ 05:07), 0.79 (04-13 @ 23:21), 0.83 (04-13 @ 17:22), 0.84 (04-13 @ 11:54), 0.79 (04-13 @ 07:00)  Glu:     Hgb: 13.0 (04-15 @ 20:49), 13.1 (04-15 @ 00:55), 13.1 (04-13 @ 23:21)  Hct: 37.8 (04-15 @ 20:49), 38.2 (04-15 @ 00:55), 40.3 (04-13 @ 23:21)  WBC: 14.6 (04-15 @ 20:49), 17.3 (04-15 @ 00:55), 19.4 (04-13 @ 23:21)  Plt: 230 (04-15 @ 20:49), 208 (04-15 @ 00:55), 224 (04-13 @ 23:21)      IMAGING:   Recent imaging studies were reviewed.    MEDICATIONS:  acetaminophen   Tablet 650 milliGRAM(s) Oral every 6 hours PRN  acetaminophen   Tablet. 650 milliGRAM(s) Oral every 6 hours PRN  amLODIPine   Tablet 5 milliGRAM(s) Oral daily  atorvastatin 40 milliGRAM(s) Oral at bedtime  docusate sodium 100 milliGRAM(s) Oral three times a day  enoxaparin Injectable 40 milliGRAM(s) SubCutaneous <User Schedule>  levothyroxine 88 MICROGram(s) Oral daily  ondansetron Injectable 4 milliGRAM(s) IV Push every 6 hours PRN  oxyCODONE    IR 5 milliGRAM(s) Oral every 4 hours PRN  oxyCODONE    IR 10 milliGRAM(s) Oral every 4 hours PRN  polyethylene glycol 3350 17 Gram(s) Oral daily  sodium chloride 0.65% Nasal 1 Spray(s) Both Nostrils daily    EXAMINATION:  General:  externam nasal dressing in place, dry.  HEENT:  MMM  Neuro:  awake, alert, oriented x 3, follows commands, moves all extremities, L inferior quandrantanopsia  Cards:  RRR  Respiratory:  no respiratory distress  Adomen:  soft  Extremities:  no edema  Skin:  warm/dry

## 2018-04-16 NOTE — PROGRESS NOTE ADULT - ASSESSMENT
52y POD #4 s/pTSRP adenoma and resection of meningioma CSF leak repaired with nasal septal flap. No complaints of nasal drainage or epistaxis. No signs of CSF leak on exam, headache controlled with pain meds, LD in palce

## 2018-04-16 NOTE — PROGRESS NOTE ADULT - SUBJECTIVE AND OBJECTIVE BOX
POD/STATUS POST/ENT ISSUE: POD #4 for TSRP and resection of meningioma    INTERVAL HPI: 52y POD #4 TSRP and resection of meningioma c/b CSF leak, repaired with fat graft and nasoseptal flap. Pt denies any clear drainage or bleeding from nares, no salty taste, no PND. C/o dry throat, tolerating regular diet, no odnophagia  Pt with headache controlled with meds.  LD in place 5ml total output this AM      Vital Signs Last 24 Hrs  T(C): 36.9 (16 Apr 2018 07:00), Max: 37.2 (15 Apr 2018 23:00)  T(F): 98.4 (16 Apr 2018 07:00), Max: 99 (15 Apr 2018 23:00)  HR: 71 (16 Apr 2018 07:00) (61 - 74)  BP: 150/80 (16 Apr 2018 07:00) (149/90 - 156/81)  BP(mean): 100 (16 Apr 2018 07:00) (99 - 106)  RR: 16 (16 Apr 2018 07:00) (14 - 23)  SpO2: 100% (16 Apr 2018 07:00) (97% - 100%)    PHYSICAL EXAM:  Gen: NAD, well-developed speech fluent no stridor  Head: Normocephalic, Atraumatic  Face: symmetrical no droop no palsy  Eyes: no scleral injection  Nose: Nares bilaterally patent, with scant serosang drainage, mustache dressing replaced, no Halo sign  Mouth: Mucosa moist, tongue/uvula midline, oropharynx clear dry blood on soft palate, no active bleeding, symmetrical tonsillar fossa  Neck: Flat, supple, no lymphadenopathy, trachea midline, no masses  Resp: breathing easily, no stridor      LABS:                        13.0   14.6  )-----------( 230      ( 15 Apr 2018 20:49 )             37.8 POD/STATUS POST/ENT ISSUE: POD #4 for TSRP and resection of meningioma    INTERVAL HPI: 52y POD #4 TSRP and resection of meningioma c/b CSF leak, repaired with fat graft and nasoseptal flap. Pt denies any clear drainage or bleeding from nares, no salty taste, no PND. C/o dry throat, tolerating regular diet, no odnophagia  Pt with headache controlled with meds.  LD in place.    Vital Signs Last 24 Hrs  T(C): 36.9 (16 Apr 2018 07:00), Max: 37.2 (15 Apr 2018 23:00)  T(F): 98.4 (16 Apr 2018 07:00), Max: 99 (15 Apr 2018 23:00)  HR: 71 (16 Apr 2018 07:00) (61 - 74)  BP: 150/80 (16 Apr 2018 07:00) (149/90 - 156/81)  BP(mean): 100 (16 Apr 2018 07:00) (99 - 106)  RR: 16 (16 Apr 2018 07:00) (14 - 23)  SpO2: 100% (16 Apr 2018 07:00) (97% - 100%)    PHYSICAL EXAM:  Gen: NAD, well-developed speech fluent no stridor  Head: Normocephalic, Atraumatic  Face: symmetrical no droop no palsy  Eyes: no scleral injection  Nose: Nares bilaterally patent, with scant serosang drainage, mustache dressing replaced, no Halo sign  Mouth: Mucosa moist, tongue/uvula midline, oropharynx clear dry blood on soft palate, no active bleeding, symmetrical tonsillar fossa  Neck: Flat, supple, no lymphadenopathy, trachea midline, no masses  Resp: breathing easily, no stridor      LABS:                        13.0   14.6  )-----------( 230      ( 15 Apr 2018 20:49 )             37.8

## 2018-04-16 NOTE — DIETITIAN INITIAL EVALUATION ADULT. - ENERGY NEEDS
Ht: 74in, Wt: 212.1 (dosing), BMI: 27.3, IBW: 190, IBW+/-10%=171-209lbs, %IBW: 112  As per chart 52 year old male POD #4 s/pTSRP adenoma and resection of meningioma CSF leak repaired with nasal septal flap.   No noted edema in chart. Pt with surgical incision, no pressure injuries noted at this time. Ht: 74in, Wt: 212.1 (dosing), BMI: 27.3, IBW: 190, IBW+/-10%=171-209lbs, %IBW: 112  As per chart 52 year old male POD #4 s/p transsphenoidal adenoma and resection of meningioma CSF leak repaired with nasal septal flap, s/p lumbar drain   No noted edema in chart. Pt with surgical incision, no pressure injuries noted at this time.

## 2018-04-16 NOTE — DIETITIAN INITIAL EVALUATION ADULT. - ORAL INTAKE PTA
good/Pt reports good appetite PTA. Pt's usual consumption of foods PTA include chicken, rice, beans, soup. Pt's supplement use PTA includes Vitamin D , Vitamin C.

## 2018-04-16 NOTE — PROGRESS NOTE ADULT - SUBJECTIVE AND OBJECTIVE BOX
HPI:    OVERNIGHT EVENTS:   No acute events overnight.    VITALS:  T(C): , Max: 37.2 (04-15-18 @ 07:00)  HR:  (61 - 74)  BP:  (137/80 - 156/81)  ABP: --  RR:  (14 - 23)  SpO2:  (97% - 100%)  Wt(kg): --      LABS:  Na: 133 (04-15 @ 20:49), 133 (04-15 @ 12:30), 134 (04-15 @ 00:55), 137 (04-14 @ 13:07), 137 (04-14 @ 05:07), 136 (04-13 @ 23:21), 140 (04-13 @ 17:22), 138 (04-13 @ 11:54), 135 (04-13 @ 07:00)  K: 4.0 (04-15 @ 20:49), 3.9 (04-15 @ 12:30), 4.0 (04-15 @ 00:55), 4.0 (04-14 @ 13:07), 4.3 (04-14 @ 05:07), 4.3 (04-13 @ 23:21), 4.2 (04-13 @ 17:22), 4.2 (04-13 @ 11:54), 4.0 (04-13 @ 07:00)  Cl: 96 (04-15 @ 20:49), 96 (04-15 @ 12:30), 99 (04-15 @ 00:55), 103 (04-14 @ 13:07), 105 (04-14 @ 05:07), 104 (04-13 @ 23:21), 107 (04-13 @ 17:22), 105 (04-13 @ 11:54), 102 (04-13 @ 07:00)  CO2: 24 (04-15 @ 20:49), 25 (04-15 @ 12:30), 23 (04-15 @ 00:55), 22 (04-14 @ 13:07), 21 (04-14 @ 05:07), 20 (04-13 @ 23:21), 22 (04-13 @ 17:22), 22 (04-13 @ 11:54), 21 (04-13 @ 07:00)  BUN: 12 (04-15 @ 20:49), 13 (04-15 @ 12:30), 11 (04-15 @ 00:55), 12 (04-14 @ 13:07), 11 (04-14 @ 05:07), 10 (04-13 @ 23:21), 11 (04-13 @ 17:22), 11 (04-13 @ 11:54), 10 (04-13 @ 07:00)  Cr: 0.85 (04-15 @ 20:49), 0.83 (04-15 @ 12:30), 0.92 (04-15 @ 00:55), 0.88 (04-14 @ 13:07), 0.88 (04-14 @ 05:07), 0.79 (04-13 @ 23:21), 0.83 (04-13 @ 17:22), 0.84 (04-13 @ 11:54), 0.79 (04-13 @ 07:00)  Glu:     Hgb: 13.0 (04-15 @ 20:49), 13.1 (04-15 @ 00:55), 13.1 (04-13 @ 23:21)  Hct: 37.8 (04-15 @ 20:49), 38.2 (04-15 @ 00:55), 40.3 (04-13 @ 23:21)  WBC: 14.6 (04-15 @ 20:49), 17.3 (04-15 @ 00:55), 19.4 (04-13 @ 23:21)  Plt: 230 (04-15 @ 20:49), 208 (04-15 @ 00:55), 224 (04-13 @ 23:21)      IMAGING:   Recent imaging studies were reviewed.    MEDICATIONS:  acetaminophen   Tablet 650 milliGRAM(s) Oral every 6 hours PRN  acetaminophen   Tablet. 650 milliGRAM(s) Oral every 6 hours PRN  amLODIPine   Tablet 5 milliGRAM(s) Oral daily  atorvastatin 40 milliGRAM(s) Oral at bedtime  docusate sodium 100 milliGRAM(s) Oral three times a day  enoxaparin Injectable 40 milliGRAM(s) SubCutaneous <User Schedule>  levothyroxine 88 MICROGram(s) Oral daily  ondansetron Injectable 4 milliGRAM(s) IV Push every 6 hours PRN  oxyCODONE    IR 5 milliGRAM(s) Oral every 4 hours PRN  oxyCODONE    IR 10 milliGRAM(s) Oral every 4 hours PRN  polyethylene glycol 3350 17 Gram(s) Oral daily  sodium chloride 0.65% Nasal 1 Spray(s) Both Nostrils daily    EXAMINATION:  General:  calm  HEENT:  MMM  Neuro:  awake, alert, oriented x 3, follows commands, moves all extremities  Cards:  RRR  Respiratory:  no respiratory distress  Adomen:  soft  Extremities:  moves all extremities  Skin:  warm/dry

## 2018-04-16 NOTE — DIETITIAN INITIAL EVALUATION ADULT. - OTHER INFO
Pt seen in NSCU for LOS. Pt reports currently average appetite. Pt's current weight is 212.1lbs. Pt reports UBW of 212 lbs, denies any recent weight changes. Pt denies any chewing or swallowing difficulties, denies any GI distress, however as per RN pt has not had a BM since admission, pt noted on bowel regimen. Pt with NKFA.

## 2018-04-16 NOTE — DIETITIAN INITIAL EVALUATION ADULT. - NS AS NUTRI INTERV ED CONTENT
Unable to provide nutrition education at this time. Follow up with Consistent CHO diet education secondary to high Hemoglobin A1C and consistently high glucose levels when pt is more alert and oriented./Purpose of the nutrition education Unable to provide nutrition education at this time. Follow up with nutrition education secondary to high Hemoglobin A1C and consistently high glucose levels when pt is more alert and oriented./Purpose of the nutrition education

## 2018-04-16 NOTE — PROGRESS NOTE ADULT - ASSESSMENT
Summary:     NEURO:  q1h neuro checks    CARDS:  -150    PULM:  sat > 92%    RENAL:  IVL    GASTRO:  advance as tolerated  ---> Stress ulcer prophylaxis:  PPI    HEME:  monitor H/H    ---> DVT prophylaxis: SCDs, hold anticoagulation, fresh    ENDO:  euglycemia    ID:  afebrile    Code status:  Full code  Disposition:  ICU    This patient was at high risk of neurologic deterioration and/or death due to:     Time spent:  45 minutes

## 2018-04-16 NOTE — PROGRESS NOTE ADULT - PROBLEM SELECTOR PLAN 1
Monitor for CSF leak, LD in place  Avoid nasal trauma/straining  Continue Nasal Sprays  Pain control prn  NSCU care

## 2018-04-16 NOTE — DIETITIAN INITIAL EVALUATION ADULT. - NUTRITION INTERVENTION
Meals and Snack/Nutrition Education/Medical Food Supplements Meals and Snack/Nutrition Education/Collaboration and Referral of Nutrition Care/Medical Food Supplements

## 2018-04-16 NOTE — DIETITIAN INITIAL EVALUATION ADULT. - NS AS NUTRI INTERV MEALS SNACK
Continue with regular diet as pt is healing, consider changing diet to consistent CHO as pt has high hemoglobin A1C./General/healthful diet General/healthful diet/Recommend changing diet to consistent CHO as pt has high hemoglobin A1C.

## 2018-04-16 NOTE — PROGRESS NOTE ADULT - ASSESSMENT
· Assessment		  Summary: 53 y/o M PMH vision loss left eye last year, was referred for MRI of the head by opthamologist and found to have a pituitary tumor.  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    NEURO:  q4h neuro checks, clamp drain 4/17     CARDS:  -150    PULM:  sat > 92%, No SIP, humified face tent prn,     RENAL:  IVL    GASTRO:  CCD diet  ---> Stress ulcer prophylaxis:  not indicated    HEME:  monitor H/H    ---> DVT prophylaxis: SCDs, chemoppx anticoagulation lovenox    ENDO:  euglycemia    ID:  afebrile    Code status:  Full code  Disposition:  ICU    This patient was at high risk of neurologic deterioration and/or death due to: infection, post surgical hemorrhage    non acute time 15m · Assessment		  Summary: 53 y/o M PMH vision loss left eye last year, was referred for MRI of the head by opthamologist and found to have a pituitary tumor. 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    NEURO:  q4h neuro checks, clamp drain 4/17     CARDS:  -150    PULM:  sat > 92%, No SIP, humified face tent prn,     RENAL:  IVL    GASTRO:  CCD diet  ---> Stress ulcer prophylaxis:  not indicated    HEME:  monitor H/H    ---> DVT prophylaxis: SCDs, chemoppx anticoagulation lovenox    ENDO:  euglycemia, levothyroxine, monitor endo labs    ID:  afebrile    Code status:  Full code  Disposition:  ICU    This patient was at high risk of neurologic deterioration and/or death due to: infection, post surgical hemorrhage    non acute time 15m · Assessment		  Summary: 53 y/o M PMH vision loss left eye last year, was referred for MRI of the head by opthamologist and found to have a pituitary tumor. 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    NEURO:  q4h neuro checks, clamp drain 4/17, analgesia prn,      CARDS:  -150, continue home cardiac meds    PULM:  sat > 92%, No SIP, humified face tent prn,     RENAL:  IVL    GASTRO:  CCD diet, increasing bowel regimen with bid miralax  ---> Stress ulcer prophylaxis:  not indicated    HEME:  monitor H/H    ---> DVT prophylaxis: SCDs, chemoppx anticoagulation lovenox    ENDO:  euglycemia, levothyroxine, monitor endo labs    ID:  afebrile    Code status:  Full code  Disposition:  ICU    non acute time 15m · Assessment		  Summary: 53 y/o M PMH vision loss left eye last year, was referred for MRI of the head by opthamologist and found to have a pituitary tumor. 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    NEURO:  q4h neuro checks, clamp drain 4/17, analgesia prn,      CARDS:  -150, continue home cardiac meds    PULM:  sat > 92%, No SIP, humified face tent prn,     RENAL:  IVL, fluid restriction 800ml day, monitor bmp    GASTRO:  CCD diet, increasing bowel regimen with bid miralax  ---> Stress ulcer prophylaxis:  not indicated    HEME:  monitor H/H  monitor pre pull coags  ---> DVT prophylaxis: SCDs, chemoppx anticoagulation lovenox    ENDO:  euglycemia, levothyroxine, monitor endo labs    ID:  afebrile    Code status:  Full code  Disposition:  ICU    non acute time 15m · Assessment		  Summary: 53 y/o M PMH vision loss left eye last year, was referred for MRI of the head by opthamologist and found to have a pituitary tumor. 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    NEURO:  q4h neuro checks, clamp drain 4/17, analgesia prn,      CARDS:  -150, continue home cardiac meds    PULM:  sat > 92%, No SIP, humified face tent prn,     RENAL:  IVL, fluid restriction 800ml day, monitor bmp    GASTRO:  CCD diet, increasing bowel regimen with bid miralax  ---> Stress ulcer prophylaxis:  not indicated    HEME:  monitor H/H  monitor pre pull coags  ---> DVT prophylaxis: SCDs, chemoppx anticoagulation lovenox    ENDO:  euglycemia, levothyroxine, monitor endo labs  SIADH - 800cc free H20 restriction     ID:  afebrile    Code status:  Full code  Disposition:  ICU    non acute time 20m

## 2018-04-17 LAB
ANION GAP SERPL CALC-SCNC: 14 MMOL/L — SIGNIFICANT CHANGE UP (ref 5–17)
APTT BLD: 41.4 SEC — HIGH (ref 27.5–37.4)
BASOPHILS # BLD AUTO: 0 K/UL — SIGNIFICANT CHANGE UP (ref 0–0.2)
BASOPHILS NFR BLD AUTO: 0.4 % — SIGNIFICANT CHANGE UP (ref 0–2)
BUN SERPL-MCNC: 19 MG/DL — SIGNIFICANT CHANGE UP (ref 7–23)
CALCIUM SERPL-MCNC: 9.7 MG/DL — SIGNIFICANT CHANGE UP (ref 8.4–10.5)
CHLORIDE SERPL-SCNC: 95 MMOL/L — LOW (ref 96–108)
CO2 SERPL-SCNC: 25 MMOL/L — SIGNIFICANT CHANGE UP (ref 22–31)
CREAT SERPL-MCNC: 1.09 MG/DL — SIGNIFICANT CHANGE UP (ref 0.5–1.3)
EOSINOPHIL # BLD AUTO: 0.5 K/UL — SIGNIFICANT CHANGE UP (ref 0–0.5)
EOSINOPHIL NFR BLD AUTO: 5 % — SIGNIFICANT CHANGE UP (ref 0–6)
GLUCOSE SERPL-MCNC: 122 MG/DL — HIGH (ref 70–99)
HCT VFR BLD CALC: 40.3 % — SIGNIFICANT CHANGE UP (ref 39–50)
HGB BLD-MCNC: 13.5 G/DL — SIGNIFICANT CHANGE UP (ref 13–17)
INR BLD: 1.08 RATIO — SIGNIFICANT CHANGE UP (ref 0.88–1.16)
LYMPHOCYTES # BLD AUTO: 2.8 K/UL — SIGNIFICANT CHANGE UP (ref 1–3.3)
LYMPHOCYTES # BLD AUTO: 26.1 % — SIGNIFICANT CHANGE UP (ref 13–44)
MAGNESIUM SERPL-MCNC: 2.2 MG/DL — SIGNIFICANT CHANGE UP (ref 1.6–2.6)
MCHC RBC-ENTMCNC: 30.6 PG — SIGNIFICANT CHANGE UP (ref 27–34)
MCHC RBC-ENTMCNC: 33.5 GM/DL — SIGNIFICANT CHANGE UP (ref 32–36)
MCV RBC AUTO: 91.1 FL — SIGNIFICANT CHANGE UP (ref 80–100)
MONOCYTES # BLD AUTO: 1 K/UL — HIGH (ref 0–0.9)
MONOCYTES NFR BLD AUTO: 9.2 % — SIGNIFICANT CHANGE UP (ref 2–14)
NEUTROPHILS # BLD AUTO: 6.3 K/UL — SIGNIFICANT CHANGE UP (ref 1.8–7.4)
NEUTROPHILS NFR BLD AUTO: 59.3 % — SIGNIFICANT CHANGE UP (ref 43–77)
PHOSPHATE SERPL-MCNC: 5.3 MG/DL — HIGH (ref 2.5–4.5)
PLATELET # BLD AUTO: 355 K/UL — SIGNIFICANT CHANGE UP (ref 150–400)
POTASSIUM SERPL-MCNC: 4.2 MMOL/L — SIGNIFICANT CHANGE UP (ref 3.5–5.3)
POTASSIUM SERPL-SCNC: 4.2 MMOL/L — SIGNIFICANT CHANGE UP (ref 3.5–5.3)
PROTHROM AB SERPL-ACNC: 11.7 SEC — SIGNIFICANT CHANGE UP (ref 9.8–12.7)
RBC # BLD: 4.42 M/UL — SIGNIFICANT CHANGE UP (ref 4.2–5.8)
RBC # FLD: 11.9 % — SIGNIFICANT CHANGE UP (ref 10.3–14.5)
SODIUM SERPL-SCNC: 134 MMOL/L — LOW (ref 135–145)
SURGICAL PATHOLOGY STUDY: SIGNIFICANT CHANGE UP
WBC # BLD: 10.6 K/UL — HIGH (ref 3.8–10.5)
WBC # FLD AUTO: 10.6 K/UL — HIGH (ref 3.8–10.5)

## 2018-04-17 PROCEDURE — 99024 POSTOP FOLLOW-UP VISIT: CPT

## 2018-04-17 PROCEDURE — 99232 SBSQ HOSP IP/OBS MODERATE 35: CPT

## 2018-04-17 RX ORDER — LACTULOSE 10 G/15ML
20 SOLUTION ORAL EVERY 4 HOURS
Qty: 0 | Refills: 0 | Status: DISCONTINUED | OUTPATIENT
Start: 2018-04-17 | End: 2018-04-18

## 2018-04-17 RX ADMIN — Medication 88 MICROGRAM(S): at 05:08

## 2018-04-17 RX ADMIN — ATORVASTATIN CALCIUM 40 MILLIGRAM(S): 80 TABLET, FILM COATED ORAL at 22:58

## 2018-04-17 RX ADMIN — Medication 100 MILLIGRAM(S): at 22:58

## 2018-04-17 RX ADMIN — ENOXAPARIN SODIUM 40 MILLIGRAM(S): 100 INJECTION SUBCUTANEOUS at 17:57

## 2018-04-17 RX ADMIN — OXYCODONE HYDROCHLORIDE 10 MILLIGRAM(S): 5 TABLET ORAL at 17:58

## 2018-04-17 RX ADMIN — POLYETHYLENE GLYCOL 3350 17 GRAM(S): 17 POWDER, FOR SOLUTION ORAL at 05:08

## 2018-04-17 RX ADMIN — LACTULOSE 20 GRAM(S): 10 SOLUTION ORAL at 14:31

## 2018-04-17 RX ADMIN — AMLODIPINE BESYLATE 10 MILLIGRAM(S): 2.5 TABLET ORAL at 05:08

## 2018-04-17 RX ADMIN — OXYCODONE HYDROCHLORIDE 10 MILLIGRAM(S): 5 TABLET ORAL at 12:18

## 2018-04-17 RX ADMIN — Medication 1 SPRAY(S): at 14:32

## 2018-04-17 RX ADMIN — LACTULOSE 20 GRAM(S): 10 SOLUTION ORAL at 17:57

## 2018-04-17 RX ADMIN — OXYCODONE HYDROCHLORIDE 10 MILLIGRAM(S): 5 TABLET ORAL at 23:45

## 2018-04-17 RX ADMIN — OXYCODONE HYDROCHLORIDE 10 MILLIGRAM(S): 5 TABLET ORAL at 04:00

## 2018-04-17 RX ADMIN — POLYETHYLENE GLYCOL 3350 17 GRAM(S): 17 POWDER, FOR SOLUTION ORAL at 01:19

## 2018-04-17 RX ADMIN — Medication 650 MILLIGRAM(S): at 05:30

## 2018-04-17 RX ADMIN — POLYETHYLENE GLYCOL 3350 17 GRAM(S): 17 POWDER, FOR SOLUTION ORAL at 17:57

## 2018-04-17 RX ADMIN — LACTULOSE 20 GRAM(S): 10 SOLUTION ORAL at 22:57

## 2018-04-17 RX ADMIN — Medication 100 MILLIGRAM(S): at 14:31

## 2018-04-17 RX ADMIN — OXYCODONE HYDROCHLORIDE 10 MILLIGRAM(S): 5 TABLET ORAL at 04:30

## 2018-04-17 RX ADMIN — Medication 100 MILLIGRAM(S): at 05:08

## 2018-04-17 RX ADMIN — Medication 650 MILLIGRAM(S): at 05:00

## 2018-04-17 NOTE — PROGRESS NOTE ADULT - SUBJECTIVE AND OBJECTIVE BOX
HPI: 51 y/o M PMH vision loss left eye last year, was referred for MRI of the head by opthamologist and found to have a pituitary tumor.  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    OVERNIGHT EVENTS:       VITALS:  T(C): , Max: 36.9 (04-16-18 @ 07:00)  HR:  (65 - 79)  BP:  (130/86 - 150/80)  ABP: --  RR:  (9 - 18)  SpO2:  (96% - 100%)  Wt(kg): --      LABS:  Na: 132 (04-16 @ 20:06), 133 (04-15 @ 20:49), 133 (04-15 @ 12:30), 134 (04-15 @ 00:55), 137 (04-14 @ 13:07)  K: 4.3 (04-16 @ 20:06), 4.0 (04-15 @ 20:49), 3.9 (04-15 @ 12:30), 4.0 (04-15 @ 00:55), 4.0 (04-14 @ 13:07)  Cl: 96 (04-16 @ 20:06), 96 (04-15 @ 20:49), 96 (04-15 @ 12:30), 99 (04-15 @ 00:55), 103 (04-14 @ 13:07)  CO2: 23 (04-16 @ 20:06), 24 (04-15 @ 20:49), 25 (04-15 @ 12:30), 23 (04-15 @ 00:55), 22 (04-14 @ 13:07)  BUN: 14 (04-16 @ 20:06), 12 (04-15 @ 20:49), 13 (04-15 @ 12:30), 11 (04-15 @ 00:55), 12 (04-14 @ 13:07)  Cr: 0.88 (04-16 @ 20:06), 0.85 (04-15 @ 20:49), 0.83 (04-15 @ 12:30), 0.92 (04-15 @ 00:55), 0.88 (04-14 @ 13:07)  Glu:     Hgb: 13.4 (04-16 @ 20:06), 13.0 (04-15 @ 20:49), 13.1 (04-15 @ 00:55)  Hct: 40.2 (04-16 @ 20:06), 37.8 (04-15 @ 20:49), 38.2 (04-15 @ 00:55)  WBC: 10.4 (04-16 @ 20:06), 14.6 (04-15 @ 20:49), 17.3 (04-15 @ 00:55)  Plt: 309 (04-16 @ 20:06), 230 (04-15 @ 20:49), 208 (04-15 @ 00:55)      IMAGING:   Recent imaging studies were reviewed.    MEDICATIONS:  acetaminophen   Tablet 650 milliGRAM(s) Oral every 6 hours PRN  acetaminophen   Tablet. 650 milliGRAM(s) Oral every 6 hours PRN  amLODIPine   Tablet 10 milliGRAM(s) Oral daily  atorvastatin 40 milliGRAM(s) Oral at bedtime  docusate sodium 100 milliGRAM(s) Oral three times a day  enoxaparin Injectable 40 milliGRAM(s) SubCutaneous <User Schedule>  levothyroxine 88 MICROGram(s) Oral daily  ondansetron Injectable 4 milliGRAM(s) IV Push every 6 hours PRN  oxyCODONE    IR 5 milliGRAM(s) Oral every 4 hours PRN  oxyCODONE    IR 10 milliGRAM(s) Oral every 4 hours PRN  polyethylene glycol 3350 17 Gram(s) Oral every 12 hours  sodium chloride 0.65% Nasal 1 Spray(s) Both Nostrils daily    EXAMINATION:  General:  external nasal dressing in place, dry.  HEENT:  MMM  Neuro:  awake, alert, oriented x 3, follows commands, moves all extremities, L inferior quandrantanopsia  Cards:  RRR  Respiratory:  no respiratory distress  Adomen:  soft  Extremities:  no edema  Skin:  warm/dry HPI: 51 y/o M [facility] vision loss left eye last year, was referred for MRI of the head by opthamologist and found to have a pituitary tumor.  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    OVERNIGHT EVENTS:   drain clamped 0530h 4/17, silva out    VITALS:  T(C): , Max: 36.9 (04-16-18 @ 07:00)  HR:  (65 - 79)  BP:  (130/86 - 150/80)  ABP: --  RR:  (9 - 18)  SpO2:  (96% - 100%)  Wt(kg): --      LABS:  Na: 132 (04-16 @ 20:06), 133 (04-15 @ 20:49), 133 (04-15 @ 12:30), 134 (04-15 @ 00:55), 137 (04-14 @ 13:07)  K: 4.3 (04-16 @ 20:06), 4.0 (04-15 @ 20:49), 3.9 (04-15 @ 12:30), 4.0 (04-15 @ 00:55), 4.0 (04-14 @ 13:07)  Cl: 96 (04-16 @ 20:06), 96 (04-15 @ 20:49), 96 (04-15 @ 12:30), 99 (04-15 @ 00:55), 103 (04-14 @ 13:07)  CO2: 23 (04-16 @ 20:06), 24 (04-15 @ 20:49), 25 (04-15 @ 12:30), 23 (04-15 @ 00:55), 22 (04-14 @ 13:07)  BUN: 14 (04-16 @ 20:06), 12 (04-15 @ 20:49), 13 (04-15 @ 12:30), 11 (04-15 @ 00:55), 12 (04-14 @ 13:07)  Cr: 0.88 (04-16 @ 20:06), 0.85 (04-15 @ 20:49), 0.83 (04-15 @ 12:30), 0.92 (04-15 @ 00:55), 0.88 (04-14 @ 13:07)  Glu:     Hgb: 13.4 (04-16 @ 20:06), 13.0 (04-15 @ 20:49), 13.1 (04-15 @ 00:55)  Hct: 40.2 (04-16 @ 20:06), 37.8 (04-15 @ 20:49), 38.2 (04-15 @ 00:55)  WBC: 10.4 (04-16 @ 20:06), 14.6 (04-15 @ 20:49), 17.3 (04-15 @ 00:55)  Plt: 309 (04-16 @ 20:06), 230 (04-15 @ 20:49), 208 (04-15 @ 00:55)      IMAGING:   Recent imaging studies were reviewed.    MEDICATIONS:  acetaminophen   Tablet 650 milliGRAM(s) Oral every 6 hours PRN  acetaminophen   Tablet. 650 milliGRAM(s) Oral every 6 hours PRN  amLODIPine   Tablet 10 milliGRAM(s) Oral daily  atorvastatin 40 milliGRAM(s) Oral at bedtime  docusate sodium 100 milliGRAM(s) Oral three times a day  enoxaparin Injectable 40 milliGRAM(s) SubCutaneous <User Schedule>  levothyroxine 88 MICROGram(s) Oral daily  ondansetron Injectable 4 milliGRAM(s) IV Push every 6 hours PRN  oxyCODONE    IR 5 milliGRAM(s) Oral every 4 hours PRN  oxyCODONE    IR 10 milliGRAM(s) Oral every 4 hours PRN  polyethylene glycol 3350 17 Gram(s) Oral every 12 hours  sodium chloride 0.65% Nasal 1 Spray(s) Both Nostrils daily    EXAMINATION:  General:  external nasal dressing in place, dry.  HEENT:  MMM  Neuro:  awake, alert, oriented x 3, follows commands, moves all extremities, L inferior quandrantanopsia  Cards:  RRR  Respiratory:  no respiratory distress  Adomen:  soft  Extremities:  no edema  Skin:  warm/dry

## 2018-04-17 NOTE — PROGRESS NOTE ADULT - ASSESSMENT
Summary: 53 y/o M PMH vision loss left eye last year, was referred for MRI of the head by opthamologist and found to have a pituitary tumor. 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    NEURO:  q4h neuro checks, clamp drain 4/17, analgesia prn,      CARDS:  -150, continue home cardiac meds    PULM:  sat > 92%, No SIP, humified face tent prn,     RENAL:  IVL, fluid restriction 800ml day, monitor bmp    GASTRO:  CCD diet, increasing bowel regimen with bid miralax  ---> Stress ulcer prophylaxis:  not indicated    HEME:  monitor H/H  monitor pre pull coags  ---> DVT prophylaxis: SCDs, chemoppx anticoagulation lovenox    ENDO:  euglycemia, levothyroxine, monitor endo labs  SIADH - 800cc free H20 restriction     ID:  afebrile    Code status:  Full code  Disposition:  ICU    non acute time 20m Summary: 51 y/o M PMH vision loss left eye last year, was referred for MRI of the head by opthamologist and found to have a pituitary tumor. 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    NEURO:  q4h neuro checks, clamp drain 4/17, analgesia prn,  LD clamped for 24 hrs if no drainage /leak will D/C     CARDS:  -150, continue home cardiac meds    PULM:  sat > 92%, No SIP, humified face tent prn,     RENAL:  IVL, fluid restriction 800ml day, monitor bmp    GASTRO:  CCD diet, increasing bowel regimen with bid miralax  ---> Stress ulcer prophylaxis:  not indicated  Lactose  q 4 hrs till BM     HEME:  monitor H/H  monitor pre pull coags  ---> DVT prophylaxis: SCDs, chemoppx anticoagulation lovenox    ENDO:  euglycemia, levothyroxine, monitor endo labs  SIADH - 800cc free H20 restriction     ID:  afebrile    Code status:  Full code  Disposition:  ICU    non acute time 20m

## 2018-04-17 NOTE — PROGRESS NOTE ADULT - ASSESSMENT
52y POD #5 s/pTSRP adenoma and resection of meningioma CSF leak repaired with nasal septal flap. No complaints of nasal drainage or epistaxis. No signs of CSF leak on exam, headache controlled with pain meds, LD in place 52y POD #5 s/pTSRP adenoma and resection of meningioma,  CSF leak repaired with nasal septal flap. No complaints of nasal drainage or epistaxis. No signs of CSF leak on exam, headache controlled with pain meds, LD in place , with loss of appetite.

## 2018-04-17 NOTE — PROGRESS NOTE ADULT - PROBLEM SELECTOR PLAN 1
Monitor for CSF leak, LD in place  Avoid nasal trauma/straining  Continue Nasal Sprays  Pain control prn  NSCU care.

## 2018-04-17 NOTE — PROGRESS NOTE ADULT - SUBJECTIVE AND OBJECTIVE BOX
POD/STATUS POST/ENT ISSUE: POD #5 for TSRP and resection of meningioma      INTERVAL HPI/OVERNIGHT EVENTS:52y POD #5 TSRP and resection of meningioma c/b CSF leak, repaired with fat graft and nasoseptal flap. Pt denies any clear drainage or bleeding from nares, no salty taste, no PND. C/o dry throat, tolerating regular diet, no odnophagia  Pt with headache controlled with meds.  LD in place.      Vital Signs Last 24 Hrs  T(C): 36.6 (17 Apr 2018 07:00), Max: 36.8 (16 Apr 2018 15:00)  T(F): 97.9 (17 Apr 2018 07:00), Max: 98.3 (16 Apr 2018 15:00)  HR: 64 (17 Apr 2018 07:00) (64 - 79)  BP: 117/78 (17 Apr 2018 07:00) (117/78 - 146/81)  BP(mean): 89 (17 Apr 2018 07:00) (89 - 100)  RR: 13 (17 Apr 2018 07:00) (9 - 18)  SpO2: 97% (17 Apr 2018 07:00) (96% - 98%)    LABS:                        13.4   10.4  )-----------( 309      ( 16 Apr 2018 20:06 )             40.2       PHYSICAL EXAM:  Gen: NAD, well-developed, speaks in full sentences  Head: Normocephalic, Atraumatic  Eyes: PERRLI, no scleral injection  Nose:  bilaterally patent, with scant serosang drainage, mustache dressing replaced, no Halo sign  Mouth: Mucosa moist, tongue/uvula midline, oropharynx clear dry blood on soft palate, no active bleeding, symmetrical tonsillar fossa  Neck: Flat, supple, no lymphadenopathy, trachea midline, no masses  Resp: breathing easily, no stridor  CV: no peripheral edema/cyanosis    IMAGING/ADDITIONAL STUDIES: POD/STATUS POST/ENT ISSUE: POD #5 for TSRP and resection of meningioma      INTERVAL HPI/OVERNIGHT EVENTS:52y POD #5 TSRP and resection of meningioma c/b CSF leak, repaired with fat graft and nasoseptal flap. Pt denies any clear drainage or bleeding from nares,, no PND. C/o dry throat, tolerating regular diet, no odnophagia.  Pt with headache controlled with meds.  LD in place, 25ml. for shift.. Nurse states pt. doesnt have much of an appetite..      Vital Signs Last 24 Hrs  T(C): 36.6 (17 Apr 2018 07:00), Max: 36.8 (16 Apr 2018 15:00)  T(F): 97.9 (17 Apr 2018 07:00), Max: 98.3 (16 Apr 2018 15:00)  HR: 64 (17 Apr 2018 07:00) (64 - 79)  BP: 117/78 (17 Apr 2018 07:00) (117/78 - 146/81)  BP(mean): 89 (17 Apr 2018 07:00) (89 - 100)  RR: 13 (17 Apr 2018 07:00) (9 - 18)  SpO2: 97% (17 Apr 2018 07:00) (96% - 98%)    LABS:                        13.4   10.4  )-----------( 309      ( 16 Apr 2018 20:06 )             40.2       PHYSICAL EXAM:  Gen: NAD, well-developed, speaks in full sentences  Head: Normocephalic, Atraumatic  Eyes: PERRLI, no scleral injection  Nose:  bilaterally patent, with scant serosang drainage, mustache dressing in place,, no Halo sign  Mouth: Mucosa moist, tongue/uvula midline, oropharynx, no active bleeding, symmetrical tonsillar fossa  Neck: Flat, supple, no lymphadenopathy, trachea midline, no masses  Resp: breathing easily, no stridor  CV: no peripheral edema/cyanosis    IMAGING/ADDITIONAL STUDIES:

## 2018-04-17 NOTE — PROGRESS NOTE ADULT - SUBJECTIVE AND OBJECTIVE BOX
Patient seen and examined    T(C): 36.8 (04-17-18 @ 15:00), Max: 36.8 (04-17-18 @ 11:00)  HR: 66 (04-17-18 @ 19:00) (64 - 78)  BP: 124/74 (04-17-18 @ 19:00) (117/78 - 146/82)  RR: 18 (04-17-18 @ 19:00) (12 - 18)  SpO2: 98% (04-17-18 @ 19:00) (96% - 99%)  04-16-18 @ 07:01  -  04-17-18 @ 07:00  --------------------------------------------------------  IN: 901 mL / OUT: 1305 mL / NET: -404 mL    04-17-18 @ 07:01  -  04-17-18 @ 21:43  --------------------------------------------------------  IN: 0 mL / OUT: 450 mL / NET: -450 mL    acetaminophen   Tablet 650 milliGRAM(s) Oral every 6 hours PRN  acetaminophen   Tablet. 650 milliGRAM(s) Oral every 6 hours PRN  amLODIPine   Tablet 10 milliGRAM(s) Oral daily  atorvastatin 40 milliGRAM(s) Oral at bedtime  docusate sodium 100 milliGRAM(s) Oral three times a day  enoxaparin Injectable 40 milliGRAM(s) SubCutaneous <User Schedule>  lactulose Syrup 20 Gram(s) Oral every 4 hours  levothyroxine 88 MICROGram(s) Oral daily  ondansetron Injectable 4 milliGRAM(s) IV Push every 6 hours PRN  oxyCODONE    IR 5 milliGRAM(s) Oral every 4 hours PRN  oxyCODONE    IR 10 milliGRAM(s) Oral every 4 hours PRN  polyethylene glycol 3350 17 Gram(s) Oral every 12 hours  sodium chloride 0.65% Nasal 1 Spray(s) Both Nostrils daily    Complete Blood Count + Automated Diff (04.17.18 @ 21:05)    WBC Count: 10.6 K/uL    RBC Count: 4.42 M/uL    Hemoglobin: 13.5 g/dL    Hematocrit: 40.3 %    Mean Cell Volume: 91.1 fl    Mean Cell Hemoglobin: 30.6 pg    Mean Cell Hemoglobin Conc: 33.5 gm/dL    Red Cell Distrib Width: 11.9 %    Platelet Count - Automated: 355 K/uL    Auto Neutrophil #: 6.3 K/uL    Auto Lymphocyte #: 2.8 K/uL    Auto Monocyte #: 1.0 K/uL    Auto Eosinophil #: 0.5 K/uL    Auto Basophil #: 0.0 K/uL    Auto Neutrophil %: 59.3: Differential percentages must be correlated with absolute numbers for  clinical significance. %    Auto Lymphocyte %: 26.1 %    Auto Monocyte %: 9.2 %    Auto Eosinophil %: 5.0 %    Auto Basophil %: 0.4 %    Basic Metabolic Panel - STAT (04.17.18 @ 21:05)    Sodium, Serum: 134 mmol/L    Potassium, Serum: 4.2 mmol/L    Chloride, Serum: 95 mmol/L    Carbon Dioxide, Serum: 25 mmol/L    Anion Gap, Serum: 14 mmol/L    Blood Urea Nitrogen, Serum: 19 mg/dL    Creatinine, Serum: 1.09 mg/dL    Glucose, Serum: 122 mg/dL    Calcium, Total Serum: 9.7 mg/dL          Exam stable    Continue same management     Azul [facility] attending

## 2018-04-17 NOTE — PROGRESS NOTE ADULT - SUBJECTIVE AND OBJECTIVE BOX
Awake, alert O x 3  No CSF rhinorrhea  Vision stable  LD clamped this morning    cont' observation and d/c drain tomorrow if no leak

## 2018-04-17 NOTE — PROGRESS NOTE ADULT - SUBJECTIVE AND OBJECTIVE BOX
Patient seen and examined at bedside.    ICU Vital Signs Last 24 Hrs  T(C): 36.7 (16 Apr 2018 19:00), Max: 37.2 (16 Apr 2018 03:00)  T(F): 98.1 (16 Apr 2018 19:00), Max: 99 (16 Apr 2018 03:00)  HR: 71 (16 Apr 2018 19:00) (65 - 79)  BP: 139/79 (16 Apr 2018 19:00) (130/86 - 150/80)  BP(mean): 96 (16 Apr 2018 19:00) (96 - 106)  ABP: --  ABP(mean): --  RR: 12 (16 Apr 2018 19:00) (9 - 18)  SpO2: 98% (16 Apr 2018 19:00) (96% - 100%)    EXAM:    AOx3, Following Commands  CN: PERRL, EOMI, no facial droop  Motor: 5/5 throughout, no drift  Sensation intact to light touch  Reflexes: no clonus   No leak seen

## 2018-04-17 NOTE — PROGRESS NOTE ADULT - ASSESSMENT
olfactory meningioma resection via transsphenoidal approach with cerebrospinal fluid status post abdominal fat graft and  lumbar drain placement  - possible LD clamping trial today; f/u with NSGY re: plan  - monitor pituitary function  - pain control  - synthroid for hypothyroidism  - amlodipine for Hypertension

## 2018-04-17 NOTE — PROGRESS NOTE ADULT - SUBJECTIVE AND OBJECTIVE BOX
No signs/symptoms of cerebrospinal fluid leak. Lumbar drain continued at 5cc every other hour.    Exam: Left temporal vision loss, otherwise intact No signs/symptoms of cerebrospinal fluid leak. Lumbar drain continued at 5cc every other hour.    ROS: No headache, no chest pain    Exam:   NAD  Lungs clear  Heart regular  Abdomen soft  Limbs without edema  Left temporal vision loss, fully oriented, no drift, full strength

## 2018-04-18 LAB
ALBUMIN SERPL ELPH-MCNC: 4 G/DL — SIGNIFICANT CHANGE UP (ref 3.3–5)
ALP SERPL-CCNC: 82 U/L — SIGNIFICANT CHANGE UP (ref 40–120)
ALT FLD-CCNC: 16 U/L — SIGNIFICANT CHANGE UP (ref 10–45)
ANION GAP SERPL CALC-SCNC: 12 MMOL/L — SIGNIFICANT CHANGE UP (ref 5–17)
APTT BLD: 39.3 SEC — HIGH (ref 27.5–37.4)
AST SERPL-CCNC: 20 U/L — SIGNIFICANT CHANGE UP (ref 10–40)
BILIRUB SERPL-MCNC: 0.4 MG/DL — SIGNIFICANT CHANGE UP (ref 0.2–1.2)
BUN SERPL-MCNC: 15 MG/DL — SIGNIFICANT CHANGE UP (ref 7–23)
CALCIUM SERPL-MCNC: 9.7 MG/DL — SIGNIFICANT CHANGE UP (ref 8.4–10.5)
CHLORIDE SERPL-SCNC: 96 MMOL/L — SIGNIFICANT CHANGE UP (ref 96–108)
CO2 SERPL-SCNC: 27 MMOL/L — SIGNIFICANT CHANGE UP (ref 22–31)
CREAT SERPL-MCNC: 0.91 MG/DL — SIGNIFICANT CHANGE UP (ref 0.5–1.3)
GLUCOSE SERPL-MCNC: 145 MG/DL — HIGH (ref 70–99)
INR BLD: 1.13 RATIO — SIGNIFICANT CHANGE UP (ref 0.88–1.16)
INR BLD: 1.13 RATIO — SIGNIFICANT CHANGE UP (ref 0.88–1.16)
MAGNESIUM SERPL-MCNC: 2.2 MG/DL — SIGNIFICANT CHANGE UP (ref 1.6–2.6)
POTASSIUM SERPL-MCNC: 4.5 MMOL/L — SIGNIFICANT CHANGE UP (ref 3.5–5.3)
POTASSIUM SERPL-SCNC: 4.5 MMOL/L — SIGNIFICANT CHANGE UP (ref 3.5–5.3)
PROT SERPL-MCNC: 8 G/DL — SIGNIFICANT CHANGE UP (ref 6–8.3)
PROTHROM AB SERPL-ACNC: 12.2 SEC — SIGNIFICANT CHANGE UP (ref 9.8–12.7)
PROTHROM AB SERPL-ACNC: 12.4 SEC — SIGNIFICANT CHANGE UP (ref 9.8–12.7)
SODIUM SERPL-SCNC: 135 MMOL/L — SIGNIFICANT CHANGE UP (ref 135–145)

## 2018-04-18 PROCEDURE — 70558 MRI BRAIN W/DYE: CPT | Mod: 26

## 2018-04-18 PROCEDURE — 99024 POSTOP FOLLOW-UP VISIT: CPT

## 2018-04-18 PROCEDURE — 99232 SBSQ HOSP IP/OBS MODERATE 35: CPT

## 2018-04-18 RX ORDER — LACTULOSE 10 G/15ML
20 SOLUTION ORAL
Qty: 0 | Refills: 0 | Status: DISCONTINUED | OUTPATIENT
Start: 2018-04-18 | End: 2018-04-18

## 2018-04-18 RX ORDER — NYSTATIN 500MM UNIT
500000 POWDER (EA) MISCELLANEOUS
Qty: 0 | Refills: 0 | Status: DISCONTINUED | OUTPATIENT
Start: 2018-04-18 | End: 2018-04-20

## 2018-04-18 RX ADMIN — OXYCODONE HYDROCHLORIDE 10 MILLIGRAM(S): 5 TABLET ORAL at 04:55

## 2018-04-18 RX ADMIN — OXYCODONE HYDROCHLORIDE 10 MILLIGRAM(S): 5 TABLET ORAL at 21:00

## 2018-04-18 RX ADMIN — ENOXAPARIN SODIUM 40 MILLIGRAM(S): 100 INJECTION SUBCUTANEOUS at 17:12

## 2018-04-18 RX ADMIN — Medication 100 MILLIGRAM(S): at 05:15

## 2018-04-18 RX ADMIN — OXYCODONE HYDROCHLORIDE 10 MILLIGRAM(S): 5 TABLET ORAL at 14:45

## 2018-04-18 RX ADMIN — Medication 100 MILLIGRAM(S): at 21:51

## 2018-04-18 RX ADMIN — POLYETHYLENE GLYCOL 3350 17 GRAM(S): 17 POWDER, FOR SOLUTION ORAL at 17:12

## 2018-04-18 RX ADMIN — OXYCODONE HYDROCHLORIDE 10 MILLIGRAM(S): 5 TABLET ORAL at 14:15

## 2018-04-18 RX ADMIN — AMLODIPINE BESYLATE 10 MILLIGRAM(S): 2.5 TABLET ORAL at 05:15

## 2018-04-18 RX ADMIN — Medication 88 MICROGRAM(S): at 05:15

## 2018-04-18 RX ADMIN — LACTULOSE 20 GRAM(S): 10 SOLUTION ORAL at 02:58

## 2018-04-18 RX ADMIN — Medication 100 MILLIGRAM(S): at 14:16

## 2018-04-18 RX ADMIN — LACTULOSE 20 GRAM(S): 10 SOLUTION ORAL at 09:28

## 2018-04-18 RX ADMIN — ATORVASTATIN CALCIUM 40 MILLIGRAM(S): 80 TABLET, FILM COATED ORAL at 21:51

## 2018-04-18 RX ADMIN — OXYCODONE HYDROCHLORIDE 10 MILLIGRAM(S): 5 TABLET ORAL at 00:15

## 2018-04-18 RX ADMIN — LACTULOSE 20 GRAM(S): 10 SOLUTION ORAL at 05:16

## 2018-04-18 RX ADMIN — OXYCODONE HYDROCHLORIDE 10 MILLIGRAM(S): 5 TABLET ORAL at 04:40

## 2018-04-18 RX ADMIN — LACTULOSE 20 GRAM(S): 10 SOLUTION ORAL at 14:16

## 2018-04-18 RX ADMIN — LACTULOSE 20 GRAM(S): 10 SOLUTION ORAL at 11:35

## 2018-04-18 RX ADMIN — POLYETHYLENE GLYCOL 3350 17 GRAM(S): 17 POWDER, FOR SOLUTION ORAL at 05:15

## 2018-04-18 RX ADMIN — Medication 500000 UNIT(S): at 17:12

## 2018-04-18 RX ADMIN — Medication 1 SPRAY(S): at 11:35

## 2018-04-18 RX ADMIN — OXYCODONE HYDROCHLORIDE 10 MILLIGRAM(S): 5 TABLET ORAL at 20:12

## 2018-04-18 NOTE — PROGRESS NOTE ADULT - ASSESSMENT
Summary: 53 y/o M PMH vision loss left eye last year, was referred for MRI of the head by opthamologist and found to have a pituitary tumor. 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    NEURO:  q4h neuro checks, dc lumbar drain, MRI on the floor    CARDS:  -150, continue home cardiac meds    PULM:  sat > 92%, TSP precautions, humified face tent prn,     RENAL:  IVL, fluid restriction 800ml day, monitor bmp    GASTRO:  CCD diet, increasing bowel regimen with bid miralax  ---> Stress ulcer prophylaxis:  not indicated  Lactulose  q 4 hrs till BM     HEME:  monitor H/H  monitor pre pull coags  ---> DVT prophylaxis: SCDs, chemoppx anticoagulation lovenox    ENDO:  euglycemia, levothyroxine, monitor endo labs  SIADH - 800cc free H20 restriction     ID:  afebrile    Code status:  Full code  Disposition:  Floor Summary: 53 y/o M PMH vision loss left eye last year, was referred for MRI of the head by opthamologist and found to have a pituitary tumor. 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    NEURO:  q4h neuro checks, dc lumbar drain, MRI on the floor    CARDS:  -150, continue home cardiac meds    PULM:  sat > 92%, TSP precautions, humified face tent prn,     RENAL:  IVL, fluid restriction 800ml day, Na sodium     GASTRO:  CCD diet, increasing bowel regimen with bid miralax  ---> Stress ulcer prophylaxis:  not indicated  Lactulose  q 2 hrs till BM     HEME:  monitor H/H  monitor pre pull coags  ---> DVT prophylaxis: SCDs, chemoppx anticoagulation lovenox    ENDO:  euglycemia, levothyroxine, monitor endo labs  SIADH - 800cc free H20 restriction     ID:  afebrile    Code status:  Full code    Time spent 30 min   Disposition:  Floor

## 2018-04-18 NOTE — PROGRESS NOTE ADULT - ASSESSMENT
52y POD #6 s/pTSRP adenoma and resection of meningioma,  CSF leak repaired with nasal septal flap. No complaints of nasal drainage or epistaxis. No signs of CSF leak on exam, headache controlled with pain meds, LD removed today, with loss of appetite. 52y POD #6 s/pTSRP adenoma and resection of meningioma,  CSF leak repaired with nasal septal flap with odynophagia and oropharyngeal thrush. No complaints of nasal drainage or epistaxis. No signs of CSF leak on exam, headache controlled with pain meds, LD removed today, with loss of appetite.

## 2018-04-18 NOTE — PROGRESS NOTE ADULT - SUBJECTIVE AND OBJECTIVE BOX
Patient seen and examined at bedside.    ICU Vital Signs Last 24 Hrs  T(C): 36.7 (17 Apr 2018 23:00), Max: 36.8 (17 Apr 2018 11:00)  T(F): 98.1 (17 Apr 2018 23:00), Max: 98.2 (17 Apr 2018 11:00)  HR: 83 (17 Apr 2018 23:00) (64 - 83)  BP: 135/84 (17 Apr 2018 23:00) (117/78 - 146/82)  BP(mean): 96 (17 Apr 2018 23:00) (88 - 100)  ABP: --  ABP(mean): --  RR: 19 (17 Apr 2018 23:00) (12 - 19)  SpO2: 98% (17 Apr 2018 23:00) (96% - 99%)    EXAM:    AOx3, Following Commands  CN: PERRL, EOMI, no facial droop  Motor: 5/5 throughout, no drift  Sensation intact to light touch  Reflexes: no clonus   No leak seen

## 2018-04-18 NOTE — CHART NOTE - NSCHARTNOTEFT_GEN_A_CORE
Lumbar Drain Removal    Patient flat in bed.  Lumbar drain clamped.  2cc lidocaine administered.  Lumbar drain/site cleaned with chloraprep.  Lumbar drain removed with no difficulties.  3-0 silk suture placed to close site, dry gauze dressing placed and covered with tegaderm.  Patient tolerated well with no immediate complications.  Remain flat in bed for 4 hours post pull.

## 2018-04-18 NOTE — PROGRESS NOTE ADULT - SUBJECTIVE AND OBJECTIVE BOX
POD/STATUS POST/ENT ISSUE: POD #6 for TSRP and resection of meningioma    INTERVAL HPI: 52y POD #6 TSRP and resection of meningioma c/b CSF leak, repaired with fat graft and nasoseptal flap. C/o dry throat and odnophagia.  Pt with headache controlled with meds.  LD removed today.  Pt denies any clear drainage or bleeding from nares.      Vital Signs Last 24 Hrs  T(C): 37.1 (18 Apr 2018 03:00), Max: 37.1 (18 Apr 2018 03:00)  T(F): 98.7 (18 Apr 2018 03:00), Max: 98.7 (18 Apr 2018 03:00)  HR: 64 (18 Apr 2018 03:00) (64 - 83)  BP: 156/89 (18 Apr 2018 03:00) (124/74 - 156/89)  BP(mean): 102 (18 Apr 2018 03:00) (88 - 102)  RR: 17 (18 Apr 2018 03:00) (12 - 19)  SpO2: 99% (18 Apr 2018 03:00) (96% - 99%)    PHYSICAL EXAM:  Gen: NAD, well-developed  Head: Normocephalic, Atraumatic  Eyes: PERRL, EOMI, no scleral injection  Nose: No CSF leak, dry blood in BL nares, no active bleeding, no pus  Mouth: Oropharyngeal thrush, no edema or ulcerations, Mucosa dry, tongue/uvula midline  Neck: Flat, supple, no lymphadenopathy, trachea midline, no masses  Resp: no stridor  CV: no peripheral edema/cyanosis    LABS:                        13.5   10.6  )-----------( 355      ( 17 Apr 2018 21:05 )             40.3

## 2018-04-18 NOTE — PROGRESS NOTE ADULT - SUBJECTIVE AND OBJECTIVE BOX
HPI:  HPI: 51 y/o M [facility] vision loss left eye last year, was referred for MRI of the head by opthamologist and found to have a pituitary tumor.  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  4/17 Drain clamped    OVERNIGHT EVENTS:   No acute events overnight.    VITALS:  T(C): , Max: 37.1 (04-18-18 @ 03:00)  HR:  (64 - 83)  BP:  (124/74 - 156/89)  ABP: --  RR:  (12 - 19)  SpO2:  (96% - 99%)  Wt(kg): --      LABS:  Na: 134 (04-17 @ 21:05), 132 (04-16 @ 20:06), 133 (04-15 @ 20:49), 133 (04-15 @ 12:30)  K: 4.2 (04-17 @ 21:05), 4.3 (04-16 @ 20:06), 4.0 (04-15 @ 20:49), 3.9 (04-15 @ 12:30)  Cl: 95 (04-17 @ 21:05), 96 (04-16 @ 20:06), 96 (04-15 @ 20:49), 96 (04-15 @ 12:30)  CO2: 25 (04-17 @ 21:05), 23 (04-16 @ 20:06), 24 (04-15 @ 20:49), 25 (04-15 @ 12:30)  BUN: 19 (04-17 @ 21:05), 14 (04-16 @ 20:06), 12 (04-15 @ 20:49), 13 (04-15 @ 12:30)  Cr: 1.09 (04-17 @ 21:05), 0.88 (04-16 @ 20:06), 0.85 (04-15 @ 20:49), 0.83 (04-15 @ 12:30)  Glu:     Hgb: 13.5 (04-17 @ 21:05), 13.4 (04-16 @ 20:06), 13.0 (04-15 @ 20:49)  Hct: 40.3 (04-17 @ 21:05), 40.2 (04-16 @ 20:06), 37.8 (04-15 @ 20:49)  WBC: 10.6 (04-17 @ 21:05), 10.4 (04-16 @ 20:06), 14.6 (04-15 @ 20:49)  Plt: 355 (04-17 @ 21:05), 309 (04-16 @ 20:06), 230 (04-15 @ 20:49)    PT: 12.2 (04-18 @ 05:51)  INR: 1.13 (04-18 @ 05:51)  aPTT: -- (04-18 @ 05:51)  PT: 11.7 (04-17 @ 21:05)  INR: 1.08 (04-17 @ 21:05)  aPTT: 41.4 (04-17 @ 21:05)    IMAGING:   Recent imaging studies were reviewed.    MEDICATIONS:  acetaminophen   Tablet 650 milliGRAM(s) Oral every 6 hours PRN  acetaminophen   Tablet. 650 milliGRAM(s) Oral every 6 hours PRN  amLODIPine   Tablet 10 milliGRAM(s) Oral daily  atorvastatin 40 milliGRAM(s) Oral at bedtime  docusate sodium 100 milliGRAM(s) Oral three times a day  enoxaparin Injectable 40 milliGRAM(s) SubCutaneous <User Schedule>  lactulose Syrup 20 Gram(s) Oral every 4 hours  levothyroxine 88 MICROGram(s) Oral daily  ondansetron Injectable 4 milliGRAM(s) IV Push every 6 hours PRN  oxyCODONE    IR 5 milliGRAM(s) Oral every 4 hours PRN  oxyCODONE    IR 10 milliGRAM(s) Oral every 4 hours PRN  polyethylene glycol 3350 17 Gram(s) Oral every 12 hours  sodium chloride 0.65% Nasal 1 Spray(s) Both Nostrils daily    EXAMINATION:  General:  external nasal dressing in place, dry.  HEENT:  MMM  Neuro:  awake, alert, oriented x 3, follows commands, moves all extremities, L inferior quandrantanopsia  Cards:  RRR  Respiratory:  no respiratory distress  Adomen:  soft  Extremities:  no edema HPI:  HPI: 51 y/o M [facility] vision loss left eye last year, was referred for MRI of the head by opthamologist and found to have a pituitary tumor.  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  4/17 Drain clamped    OVERNIGHT EVENTS:   No acute events overnight.  4/18- D/C drain     VITALS:  T(C): , Max: 37.1 (04-18-18 @ 03:00)  HR:  (64 - 83)  BP:  (124/74 - 156/89)  ABP: --  RR:  (12 - 19)  SpO2:  (96% - 99%)  Wt(kg): --      LABS:  Na: 134 (04-17 @ 21:05), 132 (04-16 @ 20:06), 133 (04-15 @ 20:49), 133 (04-15 @ 12:30)  K: 4.2 (04-17 @ 21:05), 4.3 (04-16 @ 20:06), 4.0 (04-15 @ 20:49), 3.9 (04-15 @ 12:30)  Cl: 95 (04-17 @ 21:05), 96 (04-16 @ 20:06), 96 (04-15 @ 20:49), 96 (04-15 @ 12:30)  CO2: 25 (04-17 @ 21:05), 23 (04-16 @ 20:06), 24 (04-15 @ 20:49), 25 (04-15 @ 12:30)  BUN: 19 (04-17 @ 21:05), 14 (04-16 @ 20:06), 12 (04-15 @ 20:49), 13 (04-15 @ 12:30)  Cr: 1.09 (04-17 @ 21:05), 0.88 (04-16 @ 20:06), 0.85 (04-15 @ 20:49), 0.83 (04-15 @ 12:30)  Glu:     Hgb: 13.5 (04-17 @ 21:05), 13.4 (04-16 @ 20:06), 13.0 (04-15 @ 20:49)  Hct: 40.3 (04-17 @ 21:05), 40.2 (04-16 @ 20:06), 37.8 (04-15 @ 20:49)  WBC: 10.6 (04-17 @ 21:05), 10.4 (04-16 @ 20:06), 14.6 (04-15 @ 20:49)  Plt: 355 (04-17 @ 21:05), 309 (04-16 @ 20:06), 230 (04-15 @ 20:49)    PT: 12.2 (04-18 @ 05:51)  INR: 1.13 (04-18 @ 05:51)  aPTT: -- (04-18 @ 05:51)  PT: 11.7 (04-17 @ 21:05)  INR: 1.08 (04-17 @ 21:05)  aPTT: 41.4 (04-17 @ 21:05)    IMAGING:   Recent imaging studies were reviewed.    MEDICATIONS:  acetaminophen   Tablet 650 milliGRAM(s) Oral every 6 hours PRN  acetaminophen   Tablet. 650 milliGRAM(s) Oral every 6 hours PRN  amLODIPine   Tablet 10 milliGRAM(s) Oral daily  atorvastatin 40 milliGRAM(s) Oral at bedtime  docusate sodium 100 milliGRAM(s) Oral three times a day  enoxaparin Injectable 40 milliGRAM(s) SubCutaneous <User Schedule>  lactulose Syrup 20 Gram(s) Oral every 4 hours  levothyroxine 88 MICROGram(s) Oral daily  ondansetron Injectable 4 milliGRAM(s) IV Push every 6 hours PRN  oxyCODONE    IR 5 milliGRAM(s) Oral every 4 hours PRN  oxyCODONE    IR 10 milliGRAM(s) Oral every 4 hours PRN  polyethylene glycol 3350 17 Gram(s) Oral every 12 hours  sodium chloride 0.65% Nasal 1 Spray(s) Both Nostrils daily    EXAMINATION:  General:  external nasal dressing in place, dry.  HEENT:  MMM  Neuro:  awake, alert, oriented x 3, follows commands, moves all extremities, L temp hemanopsia  Cards:  RRR  Respiratory:  no respiratory distress  Adomen:  soft  Extremities:  no edema/ dressing dry.

## 2018-04-18 NOTE — PROGRESS NOTE ADULT - ASSESSMENT
52M with pituitary mass s/p TSP for resection 4/12/18    -Neurochecks q1h  -LD clamped, DC drain in AM if no leak  -MRI before discharge

## 2018-04-18 NOTE — PROGRESS NOTE ADULT - PROBLEM SELECTOR PLAN 1
Monitor for CSF leak  Avoid nasal trauma/straining  Continue Nasal Sprays  Pain control prn  NSCU care. Monitor for CSF leak  Avoid nasal trauma/straining  Continue Nasal Sprays  Nystatin for thrush  Pain control prn  NSCU care.

## 2018-04-19 ENCOUNTER — TRANSCRIPTION ENCOUNTER (OUTPATIENT)
Age: 53
End: 2018-04-19

## 2018-04-19 DIAGNOSIS — E03.9 HYPOTHYROIDISM, UNSPECIFIED: ICD-10-CM

## 2018-04-19 DIAGNOSIS — I10 ESSENTIAL (PRIMARY) HYPERTENSION: ICD-10-CM

## 2018-04-19 DIAGNOSIS — Z29.9 ENCOUNTER FOR PROPHYLACTIC MEASURES, UNSPECIFIED: ICD-10-CM

## 2018-04-19 PROCEDURE — 99223 1ST HOSP IP/OBS HIGH 75: CPT

## 2018-04-19 PROCEDURE — 99024 POSTOP FOLLOW-UP VISIT: CPT

## 2018-04-19 RX ORDER — LEVOTHYROXINE SODIUM 125 MCG
1 TABLET ORAL
Qty: 0 | Refills: 0 | COMMUNITY
Start: 2018-04-19

## 2018-04-19 RX ORDER — AMLODIPINE BESYLATE 2.5 MG/1
1 TABLET ORAL
Qty: 0 | Refills: 0 | COMMUNITY
Start: 2018-04-19

## 2018-04-19 RX ORDER — LEVOTHYROXINE SODIUM 125 MCG
1 TABLET ORAL
Qty: 0 | Refills: 0 | COMMUNITY

## 2018-04-19 RX ORDER — OXYCODONE HYDROCHLORIDE 5 MG/1
1 TABLET ORAL
Qty: 42 | Refills: 0 | OUTPATIENT
Start: 2018-04-19 | End: 2018-04-25

## 2018-04-19 RX ORDER — ACETAMINOPHEN 500 MG
2 TABLET ORAL
Qty: 0 | Refills: 0 | COMMUNITY
Start: 2018-04-19

## 2018-04-19 RX ORDER — SODIUM CHLORIDE 0.65 %
1 AEROSOL, SPRAY (ML) NASAL
Qty: 0 | Refills: 0 | COMMUNITY
Start: 2018-04-19

## 2018-04-19 RX ADMIN — Medication 500000 UNIT(S): at 18:04

## 2018-04-19 RX ADMIN — ATORVASTATIN CALCIUM 40 MILLIGRAM(S): 80 TABLET, FILM COATED ORAL at 21:48

## 2018-04-19 RX ADMIN — Medication 1 SPRAY(S): at 14:38

## 2018-04-19 RX ADMIN — OXYCODONE HYDROCHLORIDE 10 MILLIGRAM(S): 5 TABLET ORAL at 00:25

## 2018-04-19 RX ADMIN — OXYCODONE HYDROCHLORIDE 10 MILLIGRAM(S): 5 TABLET ORAL at 10:41

## 2018-04-19 RX ADMIN — Medication 100 MILLIGRAM(S): at 06:24

## 2018-04-19 RX ADMIN — OXYCODONE HYDROCHLORIDE 10 MILLIGRAM(S): 5 TABLET ORAL at 06:25

## 2018-04-19 RX ADMIN — Medication 650 MILLIGRAM(S): at 08:36

## 2018-04-19 RX ADMIN — Medication 100 MILLIGRAM(S): at 14:38

## 2018-04-19 RX ADMIN — OXYCODONE HYDROCHLORIDE 10 MILLIGRAM(S): 5 TABLET ORAL at 11:31

## 2018-04-19 RX ADMIN — POLYETHYLENE GLYCOL 3350 17 GRAM(S): 17 POWDER, FOR SOLUTION ORAL at 18:04

## 2018-04-19 RX ADMIN — AMLODIPINE BESYLATE 10 MILLIGRAM(S): 2.5 TABLET ORAL at 06:24

## 2018-04-19 RX ADMIN — Medication 88 MICROGRAM(S): at 06:24

## 2018-04-19 RX ADMIN — ENOXAPARIN SODIUM 40 MILLIGRAM(S): 100 INJECTION SUBCUTANEOUS at 18:04

## 2018-04-19 RX ADMIN — Medication 100 MILLIGRAM(S): at 21:48

## 2018-04-19 RX ADMIN — Medication 500000 UNIT(S): at 06:24

## 2018-04-19 RX ADMIN — OXYCODONE HYDROCHLORIDE 10 MILLIGRAM(S): 5 TABLET ORAL at 07:15

## 2018-04-19 RX ADMIN — OXYCODONE HYDROCHLORIDE 10 MILLIGRAM(S): 5 TABLET ORAL at 21:56

## 2018-04-19 RX ADMIN — OXYCODONE HYDROCHLORIDE 10 MILLIGRAM(S): 5 TABLET ORAL at 22:35

## 2018-04-19 NOTE — DISCHARGE NOTE ADULT - CARE PROVIDERS DIRECT ADDRESSES
[email or web address]

## 2018-04-19 NOTE — DISCHARGE NOTE ADULT - HOSPITAL COURSE
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  He underwent elective resection via a transphenoidal approach.  The case was complicated by a CSF leak that was repaired with an abdominal fat graft and placement of a lumbar drain.  Patient was monitored in the ICU for 6 days with the lumbar drain.  Post op imaging was stable.  The lumbar drain was removed on post op day number 6 after successful clamp trial.  Patient has not been noted to be leaking.  During his course in the ICU he developed SIADH and was placed on a 800cc fluid restriction.  His sodium levels then normalized.  Patient was noted to have an elevated hemoglobin A1C on admission and was placed on a consistent carbohydrate diet even though he does not have a formal diagnosis of diabetes. He was advised to follow up with his primary care doctor.  Patient was cleared by PT and OT for discharge home with outpatient follow up.  Patient was seen by the medical hospitalist and is medically and neurologically cleared for DC home.

## 2018-04-19 NOTE — DISCHARGE NOTE ADULT - INSTRUCTIONS
Please continue a low carbohydrate, low sugar diet.  Please also continue to restrict the amount of fluid you drink.  Only drink when thirsty. Please follow up with Dr. Phipps and Dr. Smith.  Call upon discharge to schedule the appointments.  Please follow up with your primary care doctor within 1 week of discharge.  Your primary care doctor should send you for follow up lab work to monitor your sodium level.

## 2018-04-19 NOTE — PROGRESS NOTE ADULT - SUBJECTIVE AND OBJECTIVE BOX
POD/STATUS POST/ENT ISSUE: POD #7 for TSRP and resection of meningioma    INTERVAL HPI: 52y POD #7 TSRP and resection of meningioma c/b CSF leak, repaired with fat graft and nasoseptal flap. C/o dry throat and odnophagia, found to have oropharyngeal thrush 4/18, being treated with nystatin.  Pt with headache controlled with meds. Pt denies any clear drainage, salty/metallic taste or bleeding from nares.    Vital Signs Last 24 Hrs  T(C): 36.9 (19 Apr 2018 00:00), Max: 36.9 (18 Apr 2018 19:00)  T(F): 98.5 (19 Apr 2018 00:00), Max: 98.5 (18 Apr 2018 19:00)  HR: 64 (19 Apr 2018 04:00) (64 - 96)  BP: 112/80 (19 Apr 2018 04:00) (112/80 - 132/78)  BP(mean): 91 (19 Apr 2018 04:00) (87 - 93)  RR: 11 (19 Apr 2018 04:00) (11 - 22)  SpO2: 98% (19 Apr 2018 04:00) (96% - 98%)    PHYSICAL EXAM:  Gen: NAD, well-developed  Head: Normocephalic, Atraumatic  Eyes: PERRL, EOMI, no scleral injection  Nose:  No CSF leak, dry blood in BL nares, no active bleeding, no pus  Mouth: Mucosa moist, tongue/uvula midline, oropharyngeal thrush  Neck: Flat, supple, no lymphadenopathy, trachea midline, no masses  Resp: breathing easily, no stridor  CV: no peripheral edema/cyanosis    LABS:                        13.5   10.6  )-----------( 355      ( 17 Apr 2018 21:05 )             40.3

## 2018-04-19 NOTE — PROGRESS NOTE ADULT - SUBJECTIVE AND OBJECTIVE BOX
SUBJECTIVE:   Patient with complaint of headaches that are relieved with pain medication.  No acute issues, no drainage from the nares or salty taste in throat.  Patient refusing to go home today, despite no medical reason to remain in the hospital.     Vital Signs Last 24 Hrs  T(C): 36.9 (04-19-18 @ 00:00), Max: 36.9 (04-18-18 @ 19:00)  T(F): 98.5 (04-19-18 @ 00:00), Max: 98.5 (04-18-18 @ 19:00)  HR: 69 (04-19-18 @ 08:00) (64 - 96)  BP: 131/72 (04-19-18 @ 08:00) (112/80 - 132/78)  BP(mean): 83 (04-19-18 @ 08:00) (83 - 93)  RR: 8 (04-19-18 @ 08:00) (8 - 22)  SpO2: 97% (04-19-18 @ 08:00) (96% - 98%)    PHYSICAL EXAM:    Constitutional: No Acute Distress     Neurological: AOx3, Following Commands, Speech fluent,  Baseline left temporal field cut- slightly improved post op  Moving all Extremities 5/5                                                     Pulmonary: Clear to Auscultation, No rales, No rhonchi, No wheezes   Cardiovascular: S1, S2, Regular rate and rhythm   Gastrointestinal: Soft, Non-tender, Non-distended   Extremities: No calf tenderness         LABS:                          13.5   10.6  )-----------( 355      ( 17 Apr 2018 21:05 )             40.3    04-18    135  |  96  |  15  ----------------------------<  145<H>  4.5   |  27  |  0.91    Ca    9.7      18 Apr 2018 22:05  Phos  5.3     04-17  Mg     2.2     04-18    TPro  8.0  /  Alb  4.0  /  TBili  0.4  /  DBili  x   /  AST  20  /  ALT  16  /  AlkPhos  82  04-18  PT/INR - ( 18 Apr 2018 22:05 )   PT: 12.4 sec;   INR: 1.13 ratio         PTT - ( 18 Apr 2018 22:05 )  PTT:39.3 sec    04-18 @ 07:01  -  04-19 @ 07:00  --------------------------------------------------------  IN: 500 mL / OUT: 600 mL / NET: -100 mL        IMAGING:     MEDICATIONS:  Antibiotics:  nystatin    Suspension 789440 Unit(s) Oral two times a day    Neuro:  acetaminophen   Tablet 650 milliGRAM(s) Oral every 6 hours PRN For Temp greater than 38 C (100.4 F)  acetaminophen   Tablet. 650 milliGRAM(s) Oral every 6 hours PRN Mild Pain (1 - 3)  ondansetron Injectable 4 milliGRAM(s) IV Push every 6 hours PRN Nausea and/or Vomiting  oxyCODONE    IR 5 milliGRAM(s) Oral every 4 hours PRN Moderate Pain (4 - 6)  oxyCODONE    IR 10 milliGRAM(s) Oral every 4 hours PRN Severe Pain (7 - 10)    Cardiac:  amLODIPine   Tablet 10 milliGRAM(s) Oral daily    Pulm:    GI/:  docusate sodium 100 milliGRAM(s) Oral three times a day  polyethylene glycol 3350 17 Gram(s) Oral every 12 hours    Other:   atorvastatin 40 milliGRAM(s) Oral at bedtime  enoxaparin Injectable 40 milliGRAM(s) SubCutaneous <User Schedule>  levothyroxine 88 MICROGram(s) Oral daily  sodium chloride 0.65% Nasal 1 Spray(s) Both Nostrils daily        DIET: CCD

## 2018-04-19 NOTE — DISCHARGE NOTE ADULT - PLAN OF CARE
Return to prior function Please call to schedule your follow up appointment with your Neurosurgeon Dr. Phipps.  Please call to schedule your follow up appointment with you ENT surgeon Dr. Smith within 1 week of discharge.  Please call Dr. Phipps's office, or return to the ER if you have any of the following symptoms: Severe headache unrelieved by pain medication, fever >101.4, clear drainage from your nose or salty taste in the back of your throat, worsening or new vision loss, increased sleepiness, increased urination or excessive thirst.  Continue to use the Ocean nasal spray for nasal congestion.  This can be purchased over the counter at the pharmacy.  Do not take Ibuprofen, Advil, Aleve, Naproxen or other NSAIDS or Aspirin as these increase your bleeding risk.  Do not blow your nose, do not use straws and if you must sneeze, attempt to do this with an open mouth.

## 2018-04-19 NOTE — DISCHARGE NOTE ADULT - CARE PROVIDER_API CALL
Estelle Smith), Otolaryngology  [address]  [address]  [place] [address]  Phone: [phone]  Fax: [phone]    Mat Phipps), Neurological Surgery  [address]  [address]  [place] [address]  Phone: [phone]  Fax: [phone]

## 2018-04-19 NOTE — DISCHARGE NOTE ADULT - CARE PLAN
Principal Discharge DX:	Pituitary mass  Goal:	Return to prior function  Assessment and plan of treatment:	Please call to schedule your follow up appointment with your Neurosurgeon Dr. Phipps.  Please call to schedule your follow up appointment with you ENT surgeon Dr. Smith within 1 week of discharge.  Please call Dr. Phipps's office, or return to the ER if you have any of the following symptoms: Severe headache unrelieved by pain medication, fever >101.4, clear drainage from your nose or salty taste in the back of your throat, worsening or new vision loss, increased sleepiness, increased urination or excessive thirst.  Continue to use the Ocean nasal spray for nasal congestion.  This can be purchased over the counter at the pharmacy.  Do not take Ibuprofen, Advil, Aleve, Naproxen or other NSAIDS or Aspirin as these increase your bleeding risk.  Do not blow your nose, do not use straws and if you must sneeze, attempt to do this with an open mouth.

## 2018-04-19 NOTE — PROGRESS NOTE ADULT - ASSESSMENT
51 yo male s/p TSRP and resection POD 7, intraoperative CSF leak repaired with nasal septal flap with odynophagia and oropharyngeal thrush being treated with nystatin. No complaints of nasal drainage or epistaxis. No signs of CSF leak on exam, headache controlled with pain meds.

## 2018-04-19 NOTE — PROGRESS NOTE ADULT - ASSESSMENT
HPI:  53 yo male with h/o of hypothyroid, presented with complaint of left vision loss and found to have pituitary adenoma and olfactory groove meningioma on MRI is now S/P transphenoidal resection of meningioma resection complicated by CSF leak requiring fat packing and LD placement.    PROCEDURE: TSP and resection of olfactory groove meningioma with LD placement  POD#7     PLAN:  Neuro:   -Continue Tylenol and Oxycodone for pain management  -LD removed yesterday without incident. No signs of CSF leak  -Patient cleared by OT and PT for discharge home with outpatient therapy.  -ENT following, continue saline nasal spray.  -Patient with no clear medical indication to remain in the hospital.  Patient does not want to leave because he states "at home he will have to return to work."  It was discussed that the patient does not have to return to work until he his cleared by his Neurosurgeon.  Patient then states that he will be bored at home and would prefer to stay in the hospital until Saturday.  It was discussed with the patient that he cannot stay in the hospital without a medical reason.  Patient was informed that as long as he continues to be medically stable tomorrow, he will be discharged tomorrow.  -Continue TSP precautions  -Continue nystatin for oral thrush    Respiratory:   -No acute issues  -No incentive spirometry given skull base surgery.    CV:  -History of HTN- no acute issues, blood pressure well controlled. Continue Norvasc.  -History of HLD, continue Lipitor    Endocrine:   -History of hypothyroid, continue synthroid  -Patient with no documented diagnosis of diabetes, but Ha1c was 6.4.  Diabetes education ordered and patient should follow up with his primary care doctor for continued work up and management.    Heme/Onc:               -DVT ppx with SQL, SCD's and frequent ambulation    Renal:   -Hyponatremia resolving with fluid restriction.  -Continue 800cc fluid restriction and follow up BMP tomorrow AM.    ID:   -Afebrile, no signs of infection    GI:   -Continue CCD diet  -+BM yesterday    Discharge planning:   Patient advised that he is medically stable for discharge and cannot stay in the hospital because he is comfortable here.  As long as he is medically stable tomorrow AM, he will be officially discharged.

## 2018-04-19 NOTE — DISCHARGE NOTE ADULT - MEDICATION SUMMARY - MEDICATIONS TO TAKE
I will START or STAY ON the medications listed below when I get home from the hospital:    acetaminophen 325 mg oral tablet  -- 2 tab(s) by mouth every 6 hours, As needed, Mild Pain (1 - 3)  -- Indication: For Pain    oxyCODONE 10 mg oral tablet  -- 1 tab(s) by mouth every 4 hours, As needed, Severe Pain (7 - 10) MDD:6 tabs  -- Indication: For Pain    atorvastatin 40 mg oral tablet  -- 1 tab(s) by mouth once a day  -- Indication: For High cholesterol    amLODIPine 10 mg oral tablet  -- 1 tab(s) by mouth once a day  -- Indication: For High blood pressure    sodium chloride 0.65% nasal spray  -- 1 spray(s) in each nostril once a day, As Needed  -- Indication: For Nasal surgery    Azopt 1% ophthalmic suspension  -- 1 drop(s) to each affected eye 2 times a day  -- Indication: For Eye drops    levothyroxine 88 mcg (0.088 mg) oral tablet  -- 1 tab(s) by mouth once a day  -- Indication: For Hypothyroidism, unspecified type    Vitamin D3 50,000 intl units oral capsule  -- 1 cap(s) by mouth once a month  -- Indication: For Vitamin

## 2018-04-19 NOTE — DISCHARGE NOTE ADULT - MEDICATION SUMMARY - MEDICATIONS TO STOP TAKING
I will STOP taking the medications listed below when I get home from the hospital:    ibuprofen 600 mg oral tablet  -- 1 tab(s) by mouth 3 times a day, As Needed for pain  -- Do not take this drug if you are pregnant.  It is very important that you take or use this exactly as directed.  Do not skip doses or discontinue unless directed by your doctor.  May cause drowsiness or dizziness.  Obtain medical advice before taking any non-prescription drugs as some may affect the action of this medication.  Take with food or milk.

## 2018-04-19 NOTE — CONSULT NOTE ADULT - SUBJECTIVE AND OBJECTIVE BOX
Authored by Dr Rupert Goncalves [phone] / [id number]    PMD: José Bradford    Patient is a 52y old  Male who presents with a chief complaint of I'm having a tumor removed from my head. (12 Apr 2018 05:33)      HPI: 53 y/o M PMH vision loss left eye last year, was referred for MRI of the head by opthamologist and found to have a pituitary tumor.  Now s/p endoscopic removal of pituitary tumor sella meningioma 4/12/18 c/b CSF leak w/fat graft and nasoseptal flap. Lumbar drain was placed. Monitored for DI and pituitary failure. Pt tolerating clamping and removal of LD.     History limited due to: [ ] Dementia  [ ] Delirium  [ ] Condition    Pain Symptoms if applicable:             	                         none	   mild         moderate         severe  Pain:	                            0	    1-3	     4-6	         7-10  Location:	  Modifying factors:	  Associated symptoms:	    Function: [ ] Independent  [ ] Assistance  [ ] Total care  [ ] Non-ambulatory    Allergies    No Known Allergies    Intolerances        HOME MEDICATIONS: [x ] Reviewed   Home Medications:  atorvastatin 40 mg oral tablet: 1 tab(s) orally once a day (12 Apr 2018 05:27)  Azopt 1% ophthalmic suspension: 1 drop(s) to each affected eye 2 times a day (12 Apr 2018 05:27)  levothyroxine 88 mcg (0.088 mg) oral tablet: 1 tab(s) orally once a day (12 Apr 2018 05:27)  Vitamin D3 50,000 intl units oral capsule: 1 cap(s) orally once a month (12 Apr 2018 05:27)      MEDICATIONS  (STANDING):  amLODIPine   Tablet 10 milliGRAM(s) Oral daily  atorvastatin 40 milliGRAM(s) Oral at bedtime  docusate sodium 100 milliGRAM(s) Oral three times a day  enoxaparin Injectable 40 milliGRAM(s) SubCutaneous <User Schedule>  levothyroxine 88 MICROGram(s) Oral daily  nystatin    Suspension 169043 Unit(s) Oral two times a day  polyethylene glycol 3350 17 Gram(s) Oral every 12 hours  sodium chloride 0.65% Nasal 1 Spray(s) Both Nostrils daily    MEDICATIONS  (PRN):  acetaminophen   Tablet 650 milliGRAM(s) Oral every 6 hours PRN For Temp greater than 38 C (100.4 F)  acetaminophen   Tablet. 650 milliGRAM(s) Oral every 6 hours PRN Mild Pain (1 - 3)  ondansetron Injectable 4 milliGRAM(s) IV Push every 6 hours PRN Nausea and/or Vomiting  oxyCODONE    IR 5 milliGRAM(s) Oral every 4 hours PRN Moderate Pain (4 - 6)  oxyCODONE    IR 10 milliGRAM(s) Oral every 4 hours PRN Severe Pain (7 - 10)      PAST MEDICAL & SURGICAL HISTORY:  Vision loss of left eye  Hypothyroid  No significant past surgical history  [x ] Reviewed     SOCIAL HISTORY:  Residence: [ ] [facility]  [ ] SNF  [x ] Community  [ ] Substance abuse: no  [ ] Tobacco: no  [ ] Alcohol use: social     FAMILY HISTORY:  No pertinent family history in first degree relatives  [ ] No pertinent family history in first degree relatives     REVIEW OF SYSTEMS:    CONSTITUTIONAL: No fever, weight loss, or fatigue  EYES: No eye pain, visual disturbances, or discharge  ENMT:  No difficulty hearing, tinnitus, vertigo; No sinus or throat pain  NECK: No pain or stiffness  BREASTS: No pain, masses, or nipple discharge  RESPIRATORY: No cough, wheezing, chills or hemoptysis; No shortness of breath  CARDIOVASCULAR: No chest pain, palpitations, dizziness, or leg swelling  GASTROINTESTINAL: No abdominal or epigastric pain. No nausea, vomiting, or hematemesis; No diarrhea or constipation. No melena or hematochezia.  GENITOURINARY: No dysuria, frequency, hematuria, or incontinence  NEUROLOGICAL: No headaches, memory loss, loss of strength, numbness, or tremors  SKIN: No itching, burning, rashes, or lesions   LYMPH NODES: No enlarged glands  ENDOCRINE: No heat or cold intolerance; No hair loss  MUSCULOSKELETAL: No muscle or back pain  PSYCHIATRIC: No depression, anxiety, mood swings, or difficulty sleeping  HEME/LYMPH: No easy bruising, or bleeding gums  ALLERGY AND IMMUNOLOGIC: No hives or eczema    [  ] All other ROS negative  [  ] Unable to obtain due to poor mental status    Vital Signs Last 24 Hrs  T(C): 36.9 (19 Apr 2018 00:00), Max: 36.9 (18 Apr 2018 19:00)  T(F): 98.5 (19 Apr 2018 00:00), Max: 98.5 (18 Apr 2018 19:00)  HR: 66 (19 Apr 2018 12:00) (64 - 96)  BP: 124/75 (19 Apr 2018 12:00) (112/80 - 132/78)  BP(mean): 90 (19 Apr 2018 12:00) (83 - 93)  RR: 23 (19 Apr 2018 12:00) (8 - 23)  SpO2: 96% (19 Apr 2018 12:00) (95% - 98%)    PHYSICAL EXAM:    GENERAL: NAD, well-groomed, well-developed  HEAD:  Atraumatic, Normocephalic  EYES: EOMI, PERRLA, conjunctiva and sclera clear  ENMT: Moist mucous membranes  NECK: Supple, No JVD  RESPIRATORY: Clear to auscultation bilaterally; No rales, rhonchi, wheezing, or rubs  CARDIOVASCULAR: Regular rate and rhythm; No murmurs, rubs, or gallops  GASTROINTESTINAL: Soft, Nontender, Nondistended; Bowel sounds present  GENITOURINARY: Not examined  EXTREMITIES:  2+ Peripheral Pulses, No clubbing, cyanosis, or edema  NEURO:  Alert & Oriented X3; Moving all 4 extremities; No gross sensory deficits  HEME/LYMPH: No lymphadenopathy noted  SKIN: No rashes or lesions; Incisions C/D/I    LABS:                        13.5   10.6  )-----------( 355      ( 17 Apr 2018 21:05 )             40.3     04-18    135  |  96  |  15  ----------------------------<  145<H>  4.5   |  27  |  0.91    Ca    9.7      18 Apr 2018 22:05  Phos  5.3     04-17  Mg     2.2     04-18    TPro  8.0  /  Alb  4.0  /  TBili  0.4  /  DBili  x   /  AST  20  /  ALT  16  /  AlkPhos  82  04-18    PT/INR - ( 18 Apr 2018 22:05 )   PT: 12.4 sec;   INR: 1.13 ratio    PTT - ( 18 Apr 2018 22:05 )  PTT:39.3 sec    Hemoglobin A1C, Whole Blood: 6.4 % (04.13.18 @ 14:49)  Cortisol AM, Serum: 13.9: Note reference range change for CORTAM and CORTPM. ug/dL (04.15.18 @ 08:12)  Free Thyroxine, Serum: 0.6 ng/dL (04.13.18 @ 14:11)  Thyroid Stimulating Hormone, Serum: 1.21 uIU/mL (04.13.18 @ 14:11)  Specific Gravity by Meter, Urine: 1.012 (04.13.18 @ 17:22)    Osmolality, Serum: 291 mos/kg (04.13.18 @ 11:54)    CAPILLARY BLOOD GLUCOSE    Surgical Pathology Report:   ACCESSION No:  [id number]    YONATHAN FORBES  Surgical Final Report Final Diagnosis    1. Sella mass, excision:  - Invasive pituitary adenoma seen on permanent section (Slide 1-A-1)    2. Sella mass, excision: - Invasive pituitary adenoma, see comment - Chronic sinusitis and inflammatory polyp also present    3. Dura, excision - Dura with crush artifact    Comment:  Immunostains are performed on block 2B and show pituitary adenoma cells are positive for FSH and LH (patchy), but negative for GH and TSH. Rare pituitary adenoma cells are positive for ACTH, prolactin and p53.  The Ki-67 labeling index is 2-3%.    4. Subfrontal tumor, resection: - Meningioma (WHO grade I) (See note)    Note:    Tissue sections show a meningioma with occasional psammoma  bodies. 3 deep levels are performed on block 4A and 4C.  Meningioma is focally attached to the surface of brain. No brain  invasion is seen. Immunostains are performed on block 4B. The Ki-  67 labeling index is mildly elevated (2-4%). pHH3 immunostain  shows mitotic activity is inconspicuous. The findings are  diagnostic of meningioma, WHO grade I.    Slide(s) with built in immunohistochemical study control(s) and  negative control associated with this case has/have been verified  by the sign out pathologist.  For slide(s) without built in controls positive control slides  has/have been reviewed and approved by immunohistochemistry lab  These immunohistochemical/ in-situ hybridization tests have been  developed and their performance characteristics determined by  [facility], Department of Pathology,  Division of Immunopathology, [address] [place] [address].  It has not been cleared or approved by the U.S. Food  and Drug Administration.  The FDA has determined that such  clearance or approval is not necessary.  This test is used for  clinical purposes.  The laboratory is certified under the CLIA-88  as qualified to perform high complexity clinical    Intraoperative Consultation  1. Sella mass  - Fibrotic tissue with adenohypophyseal tissue with focal enlarged acini  By Dr. CHUCK Pagan    (1FS and 1 smear)    Frozen Section Performed at [facility], Department of Pathology, [address] [place] [address].    Clinical History Pituitary tumor    Specimen(s) Submitted  1. Sella mass  2. Sella mass  3. Dura  4. Subfrontal tumor    Gross Description    1. The specimen is received fresh for intraoperative consultation and the specimen container is labeled: sella mass.  It consists of three tan soft fragments of tissue ranging from 0.3 x 0.2 x 0.2 cm to 0.5 x 0.4 x 0.2 cm.   A frozen section is performed on a representative of the specimen and one smear is taken on a portion of the tissue. Entirely submitted. Two cassettes: FSA = frozen section; A = remainder of specimen.    2. The specimen is received in formalin and the specimen container is labeled: sella mass.  It consists of a 5.0 x 4.5 x  1.5 cm aggregate of pale tan-pink soft irregular tissue. Representative sections submitted.  Three cassettes.    3. The specimen is received in formalin and the specimen container is labeled: dura.  It consists of a0.7 x 0.7 x 0.3 cm  red-tan soft hemorrhagic piece of membranous tissue. The specimen is bisected and entirely submitted.  One cassette.    In addition to other data that mayappear on the specimen containers, the labels have been inspected to confirm the  presence of the patient's name and date of birth. /04/12/18 19:15 (04.12.18 @ 11:10)      RADIOLOGY & ADDITIONAL STUDIES:    EKG:   Personally Reviewed:  [ ] YES     Imaging:   Personally Reviewed:  [x ] YES < from: MR Head w/ IV Cont, I-Cran Procedure (04.18.18 @ 13:53) >    Patient again seen to be status post transhiatal surgery.    Extra-axial fat packing and hemorrhage along the planum sphenoidale and   within the sellar/suprasellar regions. Persistent mass effect upon the   optic chiasm. Peripheral enhancement of both operative beds may be   postsurgical in nature.     New edema in the bilateral inferomedial frontal lobes and in the region   of the bilateral hypothalami, postsurgical in nature.     Follow-up MRI recommended.      < end of copied text >                Consultant(s) notes reviewed:    Care Discussed with Consultant(s)/Other Providers:    Advanced Directives: [ ] DNR  [ ] No feeding tube  [ ] MOLST in chart  [ ] MOLST completed today  [ ] Unknown    [ ] Probable osteoporosis  [ ] Possible osteomalacia  [ ] Increased delirium risk  [ ] Delirium and other risks can be reduced by:          -early ambulation          -minimizing "tethers" - IV, oxygen, catheters, etc          -avoiding hypnotics and sedatives          -maintaining hydration/nutrition          -avoid anticholinergics - diphenhydramine, etc          -pain control          -supportive environment Authored by Dr Rupert Goncalves [phone] / [id number]    PMD: José Bradford    Patient is a 52y old  Male who presents with a chief complaint of I'm having a tumor removed from my head. (12 Apr 2018 05:33)      HPI: 51 y/o M PMH vision loss left eye last year, was referred for MRI of the head by opthamologist and found to have a pituitary tumor.  Now s/p endoscopic removal of pituitary tumor sella meningioma 4/12/18 c/b CSF leak w/fat graft and nasoseptal flap. Lumbar drain was placed. Monitored for DI and pituitary failure. Pt tolerating clamping and removal of LD.   Mother at bedside. Pt is without complaints - no leakage, no pain at surgical site, has been eating and walking. no dysuria. Vision returning to L eye    Function: [x ] Independent  [ ] Assistance  [ ] Total care  [ ] Non-ambulatory    Allergies    No Known Allergies    Intolerances        HOME MEDICATIONS: [x ] Reviewed   Home Medications:  atorvastatin 40 mg oral tablet: 1 tab(s) orally once a day (12 Apr 2018 05:27)  Azopt 1% ophthalmic suspension: 1 drop(s) to each affected eye 2 times a day (12 Apr 2018 05:27)  levothyroxine 88 mcg (0.088 mg) oral tablet: 1 tab(s) orally once a day (12 Apr 2018 05:27)  Vitamin D3 50,000 intl units oral capsule: 1 cap(s) orally once a month (12 Apr 2018 05:27)      MEDICATIONS  (STANDING):  amLODIPine   Tablet 10 milliGRAM(s) Oral daily  atorvastatin 40 milliGRAM(s) Oral at bedtime  docusate sodium 100 milliGRAM(s) Oral three times a day  enoxaparin Injectable 40 milliGRAM(s) SubCutaneous <User Schedule>  levothyroxine 88 MICROGram(s) Oral daily  nystatin    Suspension 195888 Unit(s) Oral two times a day  polyethylene glycol 3350 17 Gram(s) Oral every 12 hours  sodium chloride 0.65% Nasal 1 Spray(s) Both Nostrils daily    MEDICATIONS  (PRN):  acetaminophen   Tablet 650 milliGRAM(s) Oral every 6 hours PRN For Temp greater than 38 C (100.4 F)  acetaminophen   Tablet. 650 milliGRAM(s) Oral every 6 hours PRN Mild Pain (1 - 3)  ondansetron Injectable 4 milliGRAM(s) IV Push every 6 hours PRN Nausea and/or Vomiting  oxyCODONE    IR 5 milliGRAM(s) Oral every 4 hours PRN Moderate Pain (4 - 6)  oxyCODONE    IR 10 milliGRAM(s) Oral every 4 hours PRN Severe Pain (7 - 10)      PAST MEDICAL & SURGICAL HISTORY:  Vision loss of left eye  Hypothyroid  No significant past surgical history  [x ] Reviewed     SOCIAL HISTORY:  Residence: [ ] longterm  [ ] SNF  [x ] Community  [ ] Substance abuse: no  [ ] Tobacco: no  [ ] Alcohol use: social     FAMILY HISTORY:  Mother - DM/HTN    REVIEW OF SYSTEMS:    CONSTITUTIONAL: No fever, weight loss, or fatigue  EYES: vision returning L eye  ENMT:  No difficulty hearing, tinnitus, vertigo; No sinus or throat pain  RESPIRATORY: No cough, wheezing, chills or hemoptysis; No shortness of breath  CARDIOVASCULAR: No chest pain, palpitations, dizziness, or leg swelling  GASTROINTESTINAL: No abdominal or epigastric pain. No nausea, vomiting, or hematemesis; No diarrhea or constipation. No melena or hematochezia.  GENITOURINARY: No dysuria, frequency, hematuria, or incontinence  NEUROLOGICAL: No headaches, memory loss, loss of strength, numbness, or tremors  SKIN: No itching, burning, rashes, or lesions   MUSCULOSKELETAL: No muscle or back pain  PSYCHIATRIC: No depression, anxiety, mood swings, or difficulty sleeping    [  ] All other ROS negative  [  ] Unable to obtain due to poor mental status    Vital Signs Last 24 Hrs  T(C): 36.9 (19 Apr 2018 00:00), Max: 36.9 (18 Apr 2018 19:00)  T(F): 98.5 (19 Apr 2018 00:00), Max: 98.5 (18 Apr 2018 19:00)  HR: 66 (19 Apr 2018 12:00) (64 - 96)  BP: 124/75 (19 Apr 2018 12:00) (112/80 - 132/78)  BP(mean): 90 (19 Apr 2018 12:00) (83 - 93)  RR: 23 (19 Apr 2018 12:00) (8 - 23)  SpO2: 96% (19 Apr 2018 12:00) (95% - 98%)    PHYSICAL EXAM:    GENERAL: NAD, well-groomed, well-developed  HEAD:  Atraumatic, Normocephalic  EYES: EOMI, PERRLA, conjunctiva and sclera clear  ENMT: Moist mucous membranes  NECK: Supple, No JVD  RESPIRATORY: Clear to auscultation bilaterally; No rales, rhonchi, wheezing, or rubs  CARDIOVASCULAR: Regular rate and rhythm; No murmurs, rubs, or gallops  GASTROINTESTINAL: Soft, Nontender, Nondistended; Bowel sounds present  GENITOURINARY: Not examined  EXTREMITIES:  2+ Peripheral Pulses, No clubbing, cyanosis, or edema  NEURO:  Alert & Oriented X3; Moving all 4 extremities; No gross sensory deficits  HEME/LYMPH: No lymphadenopathy noted  SKIN: No rashes or lesions; Incisions C/D/I- lumbar drain site is clean after removal, dressing applied.     LABS:                        13.5   10.6  )-----------( 355      ( 17 Apr 2018 21:05 )             40.3     04-18    135  |  96  |  15  ----------------------------<  145<H>  4.5   |  27  |  0.91    Ca    9.7      18 Apr 2018 22:05  Phos  5.3     04-17  Mg     2.2     04-18    TPro  8.0  /  Alb  4.0  /  TBili  0.4  /  DBili  x   /  AST  20  /  ALT  16  /  AlkPhos  82  04-18    PT/INR - ( 18 Apr 2018 22:05 )   PT: 12.4 sec;   INR: 1.13 ratio    PTT - ( 18 Apr 2018 22:05 )  PTT:39.3 sec    Hemoglobin A1C, Whole Blood: 6.4 % (04.13.18 @ 14:49)  Cortisol AM, Serum: 13.9: Note reference range change for CORTAM and CORTPM. ug/dL (04.15.18 @ 08:12)  Free Thyroxine, Serum: 0.6 ng/dL (04.13.18 @ 14:11)  Thyroid Stimulating Hormone, Serum: 1.21 uIU/mL (04.13.18 @ 14:11)  Specific Gravity by Meter, Urine: 1.012 (04.13.18 @ 17:22)    Osmolality, Serum: 291 mos/kg (04.13.18 @ 11:54)    CAPILLARY BLOOD GLUCOSE    Surgical Pathology Report:   ACCESSION No:  10 [id number]    YONATHAN FORBES                        3  Surgical Final Report Final Diagnosis    1. Sella mass, excision:  - Invasive pituitary adenoma seen on permanent section (Slide 1-A-1)    2. Sella mass, excision: - Invasive pituitary adenoma, see comment - Chronic sinusitis and inflammatory polyp also present    3. Dura, excision - Dura with crush artifact    Comment:  Immunostains are performed on block 2B and show pituitary adenoma cells are positive for FSH and LH (patchy), but negative for GH and TSH. Rare pituitary adenoma cells are positive for ACTH, prolactin and p53.  The Ki-67 labeling index is 2-3%.    4. Subfrontal tumor, resection: - Meningioma (WHO grade I) (See note)    Note:    Tissue sections show a meningioma with occasional psammoma  bodies. 3 deep levels are performed on block 4A and 4C.  Meningioma is focally attached to the surface of brain. No brain  invasion is seen. Immunostains are performed on block 4B. The Ki-  67 labeling index is mildly elevated (2-4%). pHH3 immunostain  shows mitotic activity is inconspicuous. The findings are  diagnostic of meningioma, WHO grade I.    Slide(s) with built in immunohistochemical study control(s) and  negative control associated with this case has/have been verified  by the sign out pathologist.  For slide(s) without built in controls positive control slides  has/have been reviewed and approved by immunohistochemistry lab  These immunohistochemical/ in-situ hybridization tests have been  developed and their performance characteristics determined by  [facility], Department of Pathology,  Division of Immunopathology, [address] [place] [address].  It has not been cleared or approved by the U.S. Food  and Drug Administration.  The FDA has determined that such  clearance or approval is not necessary.  This test is used for  clinical purposes.  The laboratory is certified under the CLIA-88  as qualified to perform high complexity clinical    Intraoperative Consultation  1. Sella mass  - Fibrotic tissue with adenohypophyseal tissue with focal enlarged acini  By Dr. CHUCK Pagan    (1FS and 1 smear)    Frozen Section Performed at [facility], Department of Pathology, [address] [place] [address].    Clinical History Pituitary tumor    Specimen(s) Submitted  1. Sella mass  2. Sella mass  3. Dura  4. Subfrontal tumor    Gross Description    1. The specimen is received fresh for intraoperative consultation and the specimen container is labeled: sella mass.  It consists of three tan soft fragments of tissue ranging from 0.3 x 0.2 x 0.2 cm to 0.5 x 0.4 x 0.2 cm.   A frozen section is performed on a representative of the specimen and one smear is taken on a portion of the tissue. Entirely submitted. Two cassettes: FSA = frozen section; A = remainder of specimen.    2. The specimen is received in formalin and the specimen container is labeled: sella mass.  It consists of a 5.0 x 4.5 x  1.5 cm aggregate of pale tan-pink soft irregular tissue. Representative sections submitted.  Three cassettes.    3. The specimen is received in formalin and the specimen container is labeled: dura.  It consists of a0.7 x 0.7 x 0.3 cm  red-tan soft hemorrhagic piece of membranous tissue. The specimen is bisected and entirely submitted.  One cassette.    In addition to other data that mayappear on the specimen containers, the labels have been inspected to confirm the  presence of the patient's name and date of birth. /04/12/18 19:15 (04.12.18 @ 11:10)      RADIOLOGY & ADDITIONAL STUDIES:    EKG:   Personally Reviewed:  [ ] YES     Imaging:   Personally Reviewed:  [x ] YES < from: MR Head w/ IV Cont, I-Cran Procedure (04.18.18 @ 13:53) >    Patient again seen to be status post transhiatal surgery.    Extra-axial fat packing and hemorrhage along the planum sphenoidale and   within the sellar/suprasellar regions. Persistent mass effect upon the   optic chiasm. Peripheral enhancement of both operative beds may be   postsurgical in nature.     New edema in the bilateral inferomedial frontal lobes and in the region   of the bilateral hypothalami, postsurgical in nature.     Follow-up MRI recommended.      < end of copied text >                Consultant(s) notes reviewed:    Care Discussed with Consultant(s)/Other Providers:    Advanced Directives: [ ] DNR  [ ] No feeding tube  [ ] MOLST in chart  [ ] MOLST completed today  [ ] Unknown    [ ] Probable osteoporosis  [ ] Possible osteomalacia  [ ] Increased delirium risk  [ ] Delirium and other risks can be reduced by:          -early ambulation          -minimizing "tethers" - IV, oxygen, catheters, etc          -avoiding hypnotics and sedatives          -maintaining hydration/nutrition          -avoid anticholinergics - diphenhydramine, etc          -pain control          -supportive environment

## 2018-04-19 NOTE — DISCHARGE NOTE ADULT - PATIENT PORTAL LINK FT
You can access the [facility], offered by [facility], by registering with the following website: [email or web address]

## 2018-04-19 NOTE — PROGRESS NOTE ADULT - PROBLEM SELECTOR PLAN 1
Monitor for CSF leak  Avoid nasal trauma/straining  Continue Nasal Sprays  Nystatin for thrush  Pain control prn

## 2018-04-19 NOTE — DISCHARGE NOTE ADULT - ADDITIONAL INSTRUCTIONS
Please follow up with Dr. Phipps and Dr. Smith.  Call upon discharge to schedule the appointments.  Please follow up with your primary care doctor within 1 week of discharge.  Your primary care doctor should send you for follow up lab work to monitor your sodium level.

## 2018-04-19 NOTE — CONSULT NOTE ADULT - PROBLEM SELECTOR RECOMMENDATION 9
post op care per NSGY, ENT. No evidence of DI - strict I & O. con't endo monitoring for post pituitary procedure, no apparent evidence of AI.

## 2018-04-20 VITALS
HEART RATE: 80 BPM | DIASTOLIC BLOOD PRESSURE: 68 MMHG | SYSTOLIC BLOOD PRESSURE: 120 MMHG | RESPIRATION RATE: 16 BRPM | OXYGEN SATURATION: 99 %

## 2018-04-20 PROCEDURE — 82962 GLUCOSE BLOOD TEST: CPT

## 2018-04-20 PROCEDURE — 83036 HEMOGLOBIN GLYCOSYLATED A1C: CPT

## 2018-04-20 PROCEDURE — 70450 CT HEAD/BRAIN W/O DYE: CPT

## 2018-04-20 PROCEDURE — 85730 THROMBOPLASTIN TIME PARTIAL: CPT

## 2018-04-20 PROCEDURE — 83735 ASSAY OF MAGNESIUM: CPT

## 2018-04-20 PROCEDURE — 81005 URINALYSIS: CPT

## 2018-04-20 PROCEDURE — 86901 BLOOD TYPING SEROLOGIC RH(D): CPT

## 2018-04-20 PROCEDURE — 84443 ASSAY THYROID STIM HORMONE: CPT

## 2018-04-20 PROCEDURE — 82533 TOTAL CORTISOL: CPT

## 2018-04-20 PROCEDURE — 70558 MRI BRAIN W/DYE: CPT

## 2018-04-20 PROCEDURE — 99024 POSTOP FOLLOW-UP VISIT: CPT

## 2018-04-20 PROCEDURE — 83930 ASSAY OF BLOOD OSMOLALITY: CPT

## 2018-04-20 PROCEDURE — 97165 OT EVAL LOW COMPLEX 30 MIN: CPT

## 2018-04-20 PROCEDURE — 88342 IMHCHEM/IMCYTCHM 1ST ANTB: CPT

## 2018-04-20 PROCEDURE — 99232 SBSQ HOSP IP/OBS MODERATE 35: CPT

## 2018-04-20 PROCEDURE — 88331 PATH CONSLTJ SURG 1 BLK 1SPC: CPT

## 2018-04-20 PROCEDURE — 86923 COMPATIBILITY TEST ELECTRIC: CPT

## 2018-04-20 PROCEDURE — 80048 BASIC METABOLIC PNL TOTAL CA: CPT

## 2018-04-20 PROCEDURE — 88307 TISSUE EXAM BY PATHOLOGIST: CPT

## 2018-04-20 PROCEDURE — 85027 COMPLETE CBC AUTOMATED: CPT

## 2018-04-20 PROCEDURE — 86900 BLOOD TYPING SEROLOGIC ABO: CPT

## 2018-04-20 PROCEDURE — 97161 PT EVAL LOW COMPLEX 20 MIN: CPT

## 2018-04-20 PROCEDURE — 97116 GAIT TRAINING THERAPY: CPT

## 2018-04-20 PROCEDURE — 88305 TISSUE EXAM BY PATHOLOGIST: CPT

## 2018-04-20 PROCEDURE — P9016: CPT

## 2018-04-20 PROCEDURE — 80053 COMPREHEN METABOLIC PANEL: CPT

## 2018-04-20 PROCEDURE — 88360 TUMOR IMMUNOHISTOCHEM/MANUAL: CPT

## 2018-04-20 PROCEDURE — C1889: CPT

## 2018-04-20 PROCEDURE — 85610 PROTHROMBIN TIME: CPT

## 2018-04-20 PROCEDURE — 88341 IMHCHEM/IMCYTCHM EA ADD ANTB: CPT

## 2018-04-20 PROCEDURE — 88334 PATH CONSLTJ SURG CYTO XM EA: CPT

## 2018-04-20 PROCEDURE — 84439 ASSAY OF FREE THYROXINE: CPT

## 2018-04-20 PROCEDURE — 84100 ASSAY OF PHOSPHORUS: CPT

## 2018-04-20 PROCEDURE — C1769: CPT

## 2018-04-20 PROCEDURE — A9585: CPT

## 2018-04-20 PROCEDURE — C1729: CPT

## 2018-04-20 PROCEDURE — C1713: CPT

## 2018-04-20 PROCEDURE — 97530 THERAPEUTIC ACTIVITIES: CPT

## 2018-04-20 PROCEDURE — 36430 TRANSFUSION BLD/BLD COMPNT: CPT

## 2018-04-20 RX ADMIN — POLYETHYLENE GLYCOL 3350 17 GRAM(S): 17 POWDER, FOR SOLUTION ORAL at 05:37

## 2018-04-20 RX ADMIN — Medication 500000 UNIT(S): at 05:37

## 2018-04-20 RX ADMIN — Medication 1 SPRAY(S): at 11:39

## 2018-04-20 RX ADMIN — Medication 88 MICROGRAM(S): at 05:36

## 2018-04-20 RX ADMIN — Medication 100 MILLIGRAM(S): at 05:37

## 2018-04-20 RX ADMIN — AMLODIPINE BESYLATE 10 MILLIGRAM(S): 2.5 TABLET ORAL at 05:37

## 2018-04-20 NOTE — PROGRESS NOTE ADULT - ATTENDING COMMENTS
no sign of leakage. ok for dc from ent standpoint. f/u with me in 2 weeks in office.
No CSF rhinorrhea. LD mgmt per Dr. Phipps. Continue ICU care.
no evidence csf leak. LD mgmt per albina. will continue to follow
pt can f/u w/Dr. José Bradford PCP in addition to follow up as above

## 2018-04-20 NOTE — PROGRESS NOTE ADULT - SUBJECTIVE AND OBJECTIVE BOX
POD/STATUS POST/ENT ISSUE: POD #8 for TSRP and resection of meningioma     INTERVAL HPI: 52y POD #8 TSRP and resection of meningioma c/b CSF leak, repaired with fat graft and nasoseptal flap. C/o dry throat and odnophagia, found to have oropharyngeal thrush 4/18, being treated with nystatin.  Pt with headache controlled with meds. Pt denies any clear drainage, salty/metallic taste or bleeding from nares.      Vital Signs Last 24 Hrs  T(C): 37 (19 Apr 2018 20:00), Max: 37 (19 Apr 2018 20:00)  T(F): 98.6 (19 Apr 2018 20:00), Max: 98.6 (19 Apr 2018 20:00)  HR: 65 (20 Apr 2018 06:00) (65 - 80)  BP: 125/81 (20 Apr 2018 06:00) (107/72 - 131/72)  BP(mean): 94 (20 Apr 2018 04:00) (81 - 94)  RR: 14 (20 Apr 2018 06:00) (8 - 23)  SpO2: 100% (20 Apr 2018 06:00) (95% - 100%)    PHYSICAL EXAM:  Gen: NAD, well-developed  Head: Normocephalic, Atraumatic  Eyes: PERRL, EOMI, no scleral injection  Nose: Nares bilaterally patent, no discharge  Mouth: Mucosa moist, tongue/uvula midline, oropharynx clear  Neck: Flat, supple, no lymphadenopathy, trachea midline, no masses  Resp: breathing easily, no stridor  CV: no peripheral edema/cyanosis    LABS: POD/STATUS POST/ENT ISSUE: POD #8 for TSRP and resection of meningioma     INTERVAL HPI: 52y POD #8 TSRP and resection of meningioma c/b CSF leak, repaired with fat graft and nasoseptal flap, found to have oropharyngeal thrush 4/18, being treated with nystatin.  Odnophagia resolved, Lumbar drai D/Bennett 2 days ago. Pt with headache controlled with meds. Pt denies any clear drainage, salty/metallic taste or bleeding from nares.      Vital Signs Last 24 Hrs  T(C): 37 (19 Apr 2018 20:00), Max: 37 (19 Apr 2018 20:00)  T(F): 98.6 (19 Apr 2018 20:00), Max: 98.6 (19 Apr 2018 20:00)  HR: 65 (20 Apr 2018 06:00) (65 - 80)  BP: 125/81 (20 Apr 2018 06:00) (107/72 - 131/72)  BP(mean): 94 (20 Apr 2018 04:00) (81 - 94)  RR: 14 (20 Apr 2018 06:00) (8 - 23)  SpO2: 100% (20 Apr 2018 06:00) (95% - 100%)    PHYSICAL EXAM:  Gen: NAD, well-developed  Head: Normocephalic, Atraumatic  Eyes: PERRL, EOMI, no scleral injection  Nose: Dry crust in B/L nares, no CSF leak, no active bleeding, no pus  Mouth: Mucosa moist, tongue/uvula midline, oropharynx clear  Neck: Flat, supple, no lymphadenopathy, trachea midline, no masses  Resp: no stridor  CV: no peripheral edema/cyanosis    LABS:

## 2018-04-20 NOTE — PROGRESS NOTE ADULT - ASSESSMENT
52y POD #8 TSRP and resection of meningioma c/b CSF leak, repaired with fat graft and nasoseptal flap. Odnophagia resolved, found to have oropharyngeal thrush 4/18, being treated with nystatin.  Pt with headache controlled with meds. Pt denies any clear drainage, salty/metallic taste or bleeding from nares.

## 2018-04-20 NOTE — PROGRESS NOTE ADULT - PROBLEM SELECTOR PLAN 1
Monitor for CSF leak  Avoid nasal trauma/straining  Continue Nasal Sprays  Nystatin for thrush  Pain control prn.

## 2018-04-20 NOTE — PROGRESS NOTE ADULT - ASSESSMENT
HPI:    PROCEDURE: Transphenoidal or transnasal resection of pituitary tumor with magnetic resonance imaging guidance     POD# 8, s/p tsp olfactory groove meningioma rsxn/LD placement, fat graft    PLAN:  Neuro: stable for discharge yesterday, going home this afternoon  Respiratory: ra  CV:stable  Endocrine: stable  Heme/Onc:    stable         DVT ppx: lovenox  Renal: ivl  ID: [email or web address]  GI:              tolerating               GI ppx:  PT/OT: outpatient  Will discuss with ___      Spectralink # [id number]

## 2018-04-20 NOTE — PROGRESS NOTE ADULT - ASSESSMENT
52M w/HTN, hypothyroid a/w pituitary mass, vision loss s/p TSP c/b CSF leak s/p abd fat graft and lumbar drain

## 2018-04-20 NOTE — PROGRESS NOTE ADULT - PROVIDER SPECIALTY LIST ADULT
ENT
Hospitalist
NSICU
Neurosurgery

## 2018-04-20 NOTE — PROGRESS NOTE ADULT - PROBLEM SELECTOR PROBLEM 1
Pituitary mass

## 2018-04-20 NOTE — PROGRESS NOTE ADULT - SUBJECTIVE AND OBJECTIVE BOX
SUBJECTIVE: no complaints    OVERNIGHT EVENTS: none    Vital Signs Last 24 Hrs  T(C): 37 (19 Apr 2018 20:00), Max: 37 (19 Apr 2018 20:00)  T(F): 98.6 (19 Apr 2018 20:00), Max: 98.6 (19 Apr 2018 20:00)  HR: 72 (20 Apr 2018 08:00) (65 - 80)  BP: 124/80 (20 Apr 2018 08:00) (107/72 - 130/81)  BP(mean): 93 (20 Apr 2018 08:00) (81 - 94)  RR: 19 (20 Apr 2018 08:00) (11 - 23)  SpO2: 96% (20 Apr 2018 08:00) (95% - 100%)    PHYSICAL EXAM:    General: No Acute Distress     Neurological: Awake, alert oriented to person, place and time, Following Commands, PERRL, EOMI, Face Symmetrical, Speech Fluent, Moving all extremities, Muscle Strength normal in all four extremities, No Drift, Sensation to Light Touch Intact    Pulmonary: Clear to Auscultation, No Rales, No Rhonchi, No Wheezes     Cardiovascular: S1, S2, Regular Rate and Rhythm     Gastrointestinal: Soft, Nontender, Nondistended     Incision: R thigh fat graft donor site cdi    LABS:   04-18    135  |  96  |  15  ----------------------------<  145<H>  4.5   |  27  |  0.91    Ca    9.7      18 Apr 2018 22:05  Mg     2.2     04-18    TPro  8.0  /  Alb  4.0  /  TBili  0.4  /  DBili  x   /  AST  20  /  ALT  16  /  AlkPhos  82  04-18  PT/INR - ( 18 Apr 2018 22:05 )   PT: 12.4 sec;   INR: 1.13 ratio         PTT - ( 18 Apr 2018 22:05 )  PTT:39.3 sec  Hemoglobin A1C, Whole Blood: 6.4 % (04-13 @ 14:49)      04-19 @ 07:01  -  04-20 @ 07:00  --------------------------------------------------------  IN: 500 mL / OUT: 650 mL / NET: -150 mL      DRAINS: none    MEDICATIONS:  Antibiotics:  nystatin    Suspension 262678 Unit(s) Oral two times a day    Neuro:  acetaminophen   Tablet 650 milliGRAM(s) Oral every 6 hours PRN For Temp greater than 38 C (100.4 F)  acetaminophen   Tablet. 650 milliGRAM(s) Oral every 6 hours PRN Mild Pain (1 - 3)  ondansetron Injectable 4 milliGRAM(s) IV Push every 6 hours PRN Nausea and/or Vomiting    Cardiac:  amLODIPine   Tablet 10 milliGRAM(s) Oral daily    Pulm:    GI/:  docusate sodium 100 milliGRAM(s) Oral three times a day  polyethylene glycol 3350 17 Gram(s) Oral every 12 hours    Other:   atorvastatin 40 milliGRAM(s) Oral at bedtime  enoxaparin Injectable 40 milliGRAM(s) SubCutaneous <User Schedule>  levothyroxine 88 MICROGram(s) Oral daily  sodium chloride 0.65% Nasal 1 Spray(s) Both Nostrils daily    DIET: [xxxxxxxxxxxxxx] Regular [] CCD [] Renal [] Puree [] Dysphagia [] Tube Feeds:     IMAGING:

## 2018-04-20 NOTE — PROGRESS NOTE ADULT - SUBJECTIVE AND OBJECTIVE BOX
Authored by Dr Rupert Goncalves [phone] / [id number]    Patient is a 52y old  Male who presents with a chief complaint of I'm having a tumor removed from my head. (19 Apr 2018 15:38)    SUBJECTIVE / OVERNIGHT EVENTS: no complaints no nasal leakage    MEDICATIONS  (STANDING):  amLODIPine   Tablet 10 milliGRAM(s) Oral daily  atorvastatin 40 milliGRAM(s) Oral at bedtime  docusate sodium 100 milliGRAM(s) Oral three times a day  enoxaparin Injectable 40 milliGRAM(s) SubCutaneous <User Schedule>  levothyroxine 88 MICROGram(s) Oral daily  nystatin    Suspension 132881 Unit(s) Oral two times a day  polyethylene glycol 3350 17 Gram(s) Oral every 12 hours  sodium chloride 0.65% Nasal 1 Spray(s) Both Nostrils daily    MEDICATIONS  (PRN):  acetaminophen   Tablet 650 milliGRAM(s) Oral every 6 hours PRN For Temp greater than 38 C (100.4 F)  acetaminophen   Tablet. 650 milliGRAM(s) Oral every 6 hours PRN Mild Pain (1 - 3)  ondansetron Injectable 4 milliGRAM(s) IV Push every 6 hours PRN Nausea and/or Vomiting      Vital Signs Last 24 Hrs  T(C): 37 (20 Apr 2018 08:00), Max: 37 (19 Apr 2018 20:00)  T(F): 98.6 (20 Apr 2018 08:00), Max: 98.6 (19 Apr 2018 20:00)  HR: 80 (20 Apr 2018 14:00) (65 - 80)  BP: 120/68 (20 Apr 2018 14:00) (109/72 - 130/81)  BP(mean): 84 (20 Apr 2018 14:00) (82 - 94)  RR: 16 (20 Apr 2018 14:00) (11 - 19)  SpO2: 99% (20 Apr 2018 14:00) (96% - 100%)  CAPILLARY BLOOD GLUCOSE        I&O's Summary    19 Apr 2018 07:01  -  20 Apr 2018 07:00  --------------------------------------------------------  IN: 500 mL / OUT: 650 mL / NET: -150 mL        PHYSICAL EXAM  GENERAL: NAD, well-developed  EYES: conjunctiva and sclera clear  NECK: Supple, No JVD  ENT: MMM  NEURO: nonfocal, AOx3  PSYCH: calm   SKIN: No rashes or lesions    LABS:    04-18    135  |  96  |  15  ----------------------------<  145<H>  4.5   |  27  |  0.91    Ca    9.7      18 Apr 2018 22:05  Mg     2.2     04-18    TPro  8.0  /  Alb  4.0  /  TBili  0.4  /  DBili  x   /  AST  20  /  ALT  16  /  AlkPhos  82  04-18    PT/INR - ( 18 Apr 2018 22:05 )   PT: 12.4 sec;   INR: 1.13 ratio         PTT - ( 18 Apr 2018 22:05 )  PTT:39.3 sec            RADIOLOGY & ADDITIONAL TESTS:    Imaging Personally Reviewed:  Consultant(s) Notes Reviewed:  ENT NSGY  Care Discussed with Consultants/Other Providers:

## 2018-04-27 ENCOUNTER — APPOINTMENT (OUTPATIENT)
Dept: OTOLARYNGOLOGY | Facility: CLINIC | Age: 53
End: 2018-04-27
Payer: COMMERCIAL

## 2018-04-27 VITALS
BODY MASS INDEX: 25.54 KG/M2 | WEIGHT: 199 LBS | HEIGHT: 74 IN | SYSTOLIC BLOOD PRESSURE: 101 MMHG | HEART RATE: 83 BPM | DIASTOLIC BLOOD PRESSURE: 69 MMHG

## 2018-04-27 PROCEDURE — 99024 POSTOP FOLLOW-UP VISIT: CPT

## 2018-04-27 PROCEDURE — 31237 NSL/SINS NDSC SURG BX POLYPC: CPT | Mod: 50,58

## 2018-04-27 RX ORDER — OXYCODONE 10 MG/1
10 TABLET ORAL
Qty: 42 | Refills: 0 | Status: ACTIVE | COMMUNITY
Start: 2018-04-19

## 2018-05-01 ENCOUNTER — APPOINTMENT (OUTPATIENT)
Dept: SPINE | Facility: CLINIC | Age: 53
End: 2018-05-01
Payer: COMMERCIAL

## 2018-05-01 VITALS
WEIGHT: 200 LBS | BODY MASS INDEX: 25.67 KG/M2 | HEIGHT: 74 IN | SYSTOLIC BLOOD PRESSURE: 100 MMHG | TEMPERATURE: 97.9 F | DIASTOLIC BLOOD PRESSURE: 70 MMHG

## 2018-05-01 DIAGNOSIS — D32.0 BENIGN NEOPLASM OF CEREBRAL MENINGES: ICD-10-CM

## 2018-05-01 PROCEDURE — 99024 POSTOP FOLLOW-UP VISIT: CPT

## 2018-05-16 ENCOUNTER — APPOINTMENT (OUTPATIENT)
Dept: OTOLARYNGOLOGY | Facility: CLINIC | Age: 53
End: 2018-05-16
Payer: COMMERCIAL

## 2018-05-16 VITALS
SYSTOLIC BLOOD PRESSURE: 129 MMHG | DIASTOLIC BLOOD PRESSURE: 78 MMHG | WEIGHT: 200 LBS | HEART RATE: 81 BPM | HEIGHT: 74 IN | BODY MASS INDEX: 25.67 KG/M2

## 2018-05-16 DIAGNOSIS — D35.2 BENIGN NEOPLASM OF PITUITARY GLAND: ICD-10-CM

## 2018-05-16 DIAGNOSIS — R09.81 NASAL CONGESTION: ICD-10-CM

## 2018-05-16 PROCEDURE — 99024 POSTOP FOLLOW-UP VISIT: CPT

## 2018-05-16 PROCEDURE — 31231 NASAL ENDOSCOPY DX: CPT | Mod: 58

## 2018-06-20 ENCOUNTER — APPOINTMENT (OUTPATIENT)
Dept: OTOLARYNGOLOGY | Facility: CLINIC | Age: 53
End: 2018-06-20

## 2018-09-04 ENCOUNTER — APPOINTMENT (OUTPATIENT)
Dept: SPINE | Facility: CLINIC | Age: 53
End: 2018-09-04

## 2019-03-15 ENCOUNTER — RX RENEWAL (OUTPATIENT)
Age: 54
End: 2019-03-15
